# Patient Record
Sex: FEMALE | Race: WHITE | NOT HISPANIC OR LATINO | Employment: UNEMPLOYED | URBAN - METROPOLITAN AREA
[De-identification: names, ages, dates, MRNs, and addresses within clinical notes are randomized per-mention and may not be internally consistent; named-entity substitution may affect disease eponyms.]

---

## 2024-11-26 ENCOUNTER — HOSPITAL ENCOUNTER (INPATIENT)
Facility: HOSPITAL | Age: 34
LOS: 7 days | Discharge: HOME | DRG: 392 | End: 2024-12-03
Attending: EMERGENCY MEDICINE
Payer: MEDICAID

## 2024-11-26 DIAGNOSIS — N93.9 VAGINAL BLEEDING: ICD-10-CM

## 2024-11-26 DIAGNOSIS — R11.2 NAUSEA AND VOMITING, UNSPECIFIED VOMITING TYPE: Primary | ICD-10-CM

## 2024-11-26 DIAGNOSIS — R10.9 ABDOMINAL PAIN, UNSPECIFIED ABDOMINAL LOCATION: ICD-10-CM

## 2024-11-26 DIAGNOSIS — K59.00 CONSTIPATION, UNSPECIFIED CONSTIPATION TYPE: ICD-10-CM

## 2024-11-26 DIAGNOSIS — D62 ACUTE BLOOD LOSS ANEMIA: ICD-10-CM

## 2024-11-26 DIAGNOSIS — R07.9 CHEST PAIN: ICD-10-CM

## 2024-11-26 DIAGNOSIS — R10.31 RIGHT LOWER QUADRANT ABDOMINAL PAIN: ICD-10-CM

## 2024-11-26 DIAGNOSIS — R10.30 INTRACTABLE LOWER ABDOMINAL PAIN: ICD-10-CM

## 2024-11-26 PROBLEM — Z72.0 TOBACCO ABUSE: Status: ACTIVE | Noted: 2024-11-26

## 2024-11-26 PROBLEM — I95.9 HYPOTENSION: Status: ACTIVE | Noted: 2024-11-26

## 2024-11-26 PROBLEM — L02.419 LEG ABSCESS: Status: ACTIVE | Noted: 2024-11-26

## 2024-11-26 LAB
ALBUMIN SERPL BCP-MCNC: 3.4 G/DL (ref 3.5–5.2)
ALLENS TEST: ABNORMAL
ALLENS TEST: NORMAL
ALP SERPL-CCNC: 60 U/L (ref 40–150)
ALT SERPL W/O P-5'-P-CCNC: 8 U/L (ref 10–44)
ANION GAP SERPL CALC-SCNC: 6 MMOL/L (ref 8–16)
ANION GAP SERPL CALC-SCNC: 9 MMOL/L (ref 8–16)
AST SERPL-CCNC: 12 U/L (ref 10–40)
B-HCG UR QL: NEGATIVE
BASOPHILS # BLD AUTO: 0.05 K/UL (ref 0–0.2)
BASOPHILS NFR BLD: 0.3 % (ref 0–1.9)
BILIRUB SERPL-MCNC: 0.6 MG/DL (ref 0.1–1)
BILIRUB UR QL STRIP: NEGATIVE
BUN SERPL-MCNC: 13 MG/DL (ref 6–20)
BUN SERPL-MCNC: 14 MG/DL (ref 6–30)
BUN SERPL-MCNC: 9 MG/DL (ref 6–20)
CALCIUM SERPL-MCNC: 7.9 MG/DL (ref 8.7–10.5)
CALCIUM SERPL-MCNC: 9.2 MG/DL (ref 8.7–10.5)
CHLORIDE SERPL-SCNC: 103 MMOL/L (ref 95–110)
CHLORIDE SERPL-SCNC: 107 MMOL/L (ref 95–110)
CHLORIDE SERPL-SCNC: 99 MMOL/L (ref 95–110)
CLARITY UR REFRACT.AUTO: ABNORMAL
CO2 SERPL-SCNC: 24 MMOL/L (ref 23–29)
CO2 SERPL-SCNC: 24 MMOL/L (ref 23–29)
COLOR UR AUTO: YELLOW
CREAT SERPL-MCNC: 0.6 MG/DL (ref 0.5–1.4)
CREAT SERPL-MCNC: 0.6 MG/DL (ref 0.5–1.4)
CREAT SERPL-MCNC: 0.7 MG/DL (ref 0.5–1.4)
CTP QC/QA: YES
DIFFERENTIAL METHOD BLD: ABNORMAL
EOSINOPHIL # BLD AUTO: 0 K/UL (ref 0–0.5)
EOSINOPHIL NFR BLD: 0.2 % (ref 0–8)
ERYTHROCYTE [DISTWIDTH] IN BLOOD BY AUTOMATED COUNT: 16.7 % (ref 11.5–14.5)
EST. GFR  (NO RACE VARIABLE): >60 ML/MIN/1.73 M^2
EST. GFR  (NO RACE VARIABLE): >60 ML/MIN/1.73 M^2
GLUCOSE SERPL-MCNC: 66 MG/DL (ref 70–110)
GLUCOSE SERPL-MCNC: 72 MG/DL (ref 70–110)
GLUCOSE SERPL-MCNC: 89 MG/DL (ref 70–110)
GLUCOSE UR QL STRIP: NEGATIVE
HCO3 UR-SCNC: 26.4 MMOL/L (ref 24–28)
HCT VFR BLD AUTO: 26.8 % (ref 37–48.5)
HCT VFR BLD CALC: 28 %PCV (ref 36–54)
HCV AB SERPL QL IA: REACTIVE
HCV RNA SERPL QL NAA+PROBE: NOT DETECTED
HCV RNA SPEC NAA+PROBE-ACNC: NOT DETECTED IU/ML
HGB BLD-MCNC: 7.9 G/DL (ref 12–16)
HGB UR QL STRIP: ABNORMAL
HIV 1+2 AB+HIV1 P24 AG SERPL QL IA: NORMAL
IMM GRANULOCYTES # BLD AUTO: 0.06 K/UL (ref 0–0.04)
IMM GRANULOCYTES NFR BLD AUTO: 0.4 % (ref 0–0.5)
KETONES UR QL STRIP: NEGATIVE
LACTATE SERPL-SCNC: 0.6 MMOL/L (ref 0.5–2.2)
LACTATE SERPL-SCNC: 0.6 MMOL/L (ref 0.5–2.2)
LDH SERPL L TO P-CCNC: 1.8 MMOL/L (ref 0.5–2.2)
LEUKOCYTE ESTERASE UR QL STRIP: ABNORMAL
LIPASE SERPL-CCNC: 6 U/L (ref 4–60)
LYMPHOCYTES # BLD AUTO: 1.6 K/UL (ref 1–4.8)
LYMPHOCYTES NFR BLD: 10 % (ref 18–48)
MCH RBC QN AUTO: 18.7 PG (ref 27–31)
MCHC RBC AUTO-ENTMCNC: 29.5 G/DL (ref 32–36)
MCV RBC AUTO: 63 FL (ref 82–98)
MICROSCOPIC COMMENT: ABNORMAL
MONOCYTES # BLD AUTO: 0.7 K/UL (ref 0.3–1)
MONOCYTES NFR BLD: 4.1 % (ref 4–15)
NEUTROPHILS # BLD AUTO: 13.8 K/UL (ref 1.8–7.7)
NEUTROPHILS NFR BLD: 85 % (ref 38–73)
NITRITE UR QL STRIP: NEGATIVE
NRBC BLD-RTO: 0 /100 WBC
OHS QRS DURATION: 92 MS
OHS QTC CALCULATION: 441 MS
PCO2 BLDA: 45 MMHG (ref 35–45)
PH SMN: 7.38 [PH] (ref 7.35–7.45)
PH UR STRIP: 8 [PH] (ref 5–8)
PLATELET # BLD AUTO: 293 K/UL (ref 150–450)
PMV BLD AUTO: ABNORMAL FL (ref 9.2–12.9)
PO2 BLDA: 92 MMHG (ref 40–60)
POC BE: 1 MMOL/L
POC IONIZED CALCIUM: 1.18 MMOL/L (ref 1.06–1.42)
POC SATURATED O2: 97 % (ref 95–100)
POC TCO2 (MEASURED): 26 MMOL/L (ref 23–29)
POC TCO2: 28 MMOL/L (ref 24–29)
POTASSIUM BLD-SCNC: 3.7 MMOL/L (ref 3.5–5.1)
POTASSIUM SERPL-SCNC: 3.5 MMOL/L (ref 3.5–5.1)
POTASSIUM SERPL-SCNC: 3.8 MMOL/L (ref 3.5–5.1)
PROT SERPL-MCNC: 7.5 G/DL (ref 6–8.4)
PROT UR QL STRIP: ABNORMAL
RBC # BLD AUTO: 4.23 M/UL (ref 4–5.4)
RBC #/AREA URNS AUTO: 26 /HPF (ref 0–4)
SAMPLE: ABNORMAL
SAMPLE: ABNORMAL
SAMPLE: NORMAL
SITE: ABNORMAL
SITE: NORMAL
SODIUM BLD-SCNC: 136 MMOL/L (ref 136–145)
SODIUM SERPL-SCNC: 136 MMOL/L (ref 136–145)
SODIUM SERPL-SCNC: 137 MMOL/L (ref 136–145)
SP GR UR STRIP: 1.02 (ref 1–1.03)
SQUAMOUS #/AREA URNS AUTO: 5 /HPF
URN SPEC COLLECT METH UR: ABNORMAL
WBC # BLD AUTO: 16.28 K/UL (ref 3.9–12.7)
WBC #/AREA URNS AUTO: 2 /HPF (ref 0–5)

## 2024-11-26 PROCEDURE — 25000003 PHARM REV CODE 250: Performed by: EMERGENCY MEDICINE

## 2024-11-26 PROCEDURE — 63600175 PHARM REV CODE 636 W HCPCS

## 2024-11-26 PROCEDURE — 63600175 PHARM REV CODE 636 W HCPCS: Performed by: PHYSICIAN ASSISTANT

## 2024-11-26 PROCEDURE — 96367 TX/PROPH/DG ADDL SEQ IV INF: CPT

## 2024-11-26 PROCEDURE — G0378 HOSPITAL OBSERVATION PER HR: HCPCS

## 2024-11-26 PROCEDURE — A4216 STERILE WATER/SALINE, 10 ML: HCPCS | Performed by: PHYSICIAN ASSISTANT

## 2024-11-26 PROCEDURE — 87040 BLOOD CULTURE FOR BACTERIA: CPT | Mod: 59 | Performed by: EMERGENCY MEDICINE

## 2024-11-26 PROCEDURE — 25000003 PHARM REV CODE 250: Performed by: HOSPITALIST

## 2024-11-26 PROCEDURE — 83605 ASSAY OF LACTIC ACID: CPT

## 2024-11-26 PROCEDURE — 25500020 PHARM REV CODE 255: Performed by: EMERGENCY MEDICINE

## 2024-11-26 PROCEDURE — 25000003 PHARM REV CODE 250: Performed by: PHYSICIAN ASSISTANT

## 2024-11-26 PROCEDURE — 96375 TX/PRO/DX INJ NEW DRUG ADDON: CPT

## 2024-11-26 PROCEDURE — 85025 COMPLETE CBC W/AUTO DIFF WBC: CPT

## 2024-11-26 PROCEDURE — 20600001 HC STEP DOWN PRIVATE ROOM

## 2024-11-26 PROCEDURE — 96366 THER/PROPH/DIAG IV INF ADDON: CPT

## 2024-11-26 PROCEDURE — 83690 ASSAY OF LIPASE: CPT

## 2024-11-26 PROCEDURE — 81025 URINE PREGNANCY TEST: CPT

## 2024-11-26 PROCEDURE — 87070 CULTURE OTHR SPECIMN AEROBIC: CPT | Performed by: EMERGENCY MEDICINE

## 2024-11-26 PROCEDURE — 87077 CULTURE AEROBIC IDENTIFY: CPT | Performed by: EMERGENCY MEDICINE

## 2024-11-26 PROCEDURE — 83605 ASSAY OF LACTIC ACID: CPT | Performed by: PHYSICIAN ASSISTANT

## 2024-11-26 PROCEDURE — 63600175 PHARM REV CODE 636 W HCPCS: Performed by: EMERGENCY MEDICINE

## 2024-11-26 PROCEDURE — 82803 BLOOD GASES ANY COMBINATION: CPT

## 2024-11-26 PROCEDURE — 80048 BASIC METABOLIC PNL TOTAL CA: CPT | Mod: XB | Performed by: HOSPITALIST

## 2024-11-26 PROCEDURE — 99900035 HC TECH TIME PER 15 MIN (STAT)

## 2024-11-26 PROCEDURE — 99285 EMERGENCY DEPT VISIT HI MDM: CPT | Mod: 25

## 2024-11-26 PROCEDURE — 81001 URINALYSIS AUTO W/SCOPE: CPT

## 2024-11-26 PROCEDURE — 96361 HYDRATE IV INFUSION ADD-ON: CPT

## 2024-11-26 PROCEDURE — 25000003 PHARM REV CODE 250

## 2024-11-26 PROCEDURE — 87389 HIV-1 AG W/HIV-1&-2 AB AG IA: CPT | Performed by: PHYSICIAN ASSISTANT

## 2024-11-26 PROCEDURE — 87522 HEPATITIS C REVRS TRNSCRPJ: CPT | Performed by: PHYSICIAN ASSISTANT

## 2024-11-26 PROCEDURE — 86803 HEPATITIS C AB TEST: CPT | Performed by: PHYSICIAN ASSISTANT

## 2024-11-26 PROCEDURE — 83605 ASSAY OF LACTIC ACID: CPT | Mod: 91 | Performed by: HOSPITALIST

## 2024-11-26 PROCEDURE — 80053 COMPREHEN METABOLIC PANEL: CPT

## 2024-11-26 PROCEDURE — 96376 TX/PRO/DX INJ SAME DRUG ADON: CPT

## 2024-11-26 PROCEDURE — 96365 THER/PROPH/DIAG IV INF INIT: CPT

## 2024-11-26 RX ORDER — KETOROLAC TROMETHAMINE 30 MG/ML
15 INJECTION, SOLUTION INTRAMUSCULAR; INTRAVENOUS EVERY 6 HOURS PRN
Status: DISPENSED | OUTPATIENT
Start: 2024-11-26 | End: 2024-11-29

## 2024-11-26 RX ORDER — TALC
6 POWDER (GRAM) TOPICAL NIGHTLY PRN
Status: DISCONTINUED | OUTPATIENT
Start: 2024-11-26 | End: 2024-12-03 | Stop reason: HOSPADM

## 2024-11-26 RX ORDER — METHADONE HYDROCHLORIDE 10 MG/1
60 TABLET ORAL
Status: DISCONTINUED | OUTPATIENT
Start: 2024-11-26 | End: 2024-11-26

## 2024-11-26 RX ORDER — MORPHINE SULFATE 2 MG/ML
2 INJECTION, SOLUTION INTRAMUSCULAR; INTRAVENOUS EVERY 4 HOURS PRN
Status: DISCONTINUED | OUTPATIENT
Start: 2024-11-26 | End: 2024-11-26

## 2024-11-26 RX ORDER — ONDANSETRON HYDROCHLORIDE 2 MG/ML
4 INJECTION, SOLUTION INTRAVENOUS
Status: COMPLETED | OUTPATIENT
Start: 2024-11-26 | End: 2024-11-26

## 2024-11-26 RX ORDER — ACETAMINOPHEN 500 MG
1000 TABLET ORAL EVERY 8 HOURS
Status: DISCONTINUED | OUTPATIENT
Start: 2024-11-26 | End: 2024-12-03 | Stop reason: HOSPADM

## 2024-11-26 RX ORDER — SODIUM CHLORIDE 0.9 % (FLUSH) 0.9 %
10 SYRINGE (ML) INJECTION
Status: DISCONTINUED | OUTPATIENT
Start: 2024-11-26 | End: 2024-12-03 | Stop reason: HOSPADM

## 2024-11-26 RX ORDER — POLYETHYLENE GLYCOL 3350 17 G/17G
17 POWDER, FOR SOLUTION ORAL DAILY PRN
Status: DISCONTINUED | OUTPATIENT
Start: 2024-11-26 | End: 2024-11-28

## 2024-11-26 RX ORDER — LIDOCAINE HYDROCHLORIDE 10 MG/ML
10 INJECTION, SOLUTION EPIDURAL; INFILTRATION; INTRACAUDAL; PERINEURAL
Status: COMPLETED | OUTPATIENT
Start: 2024-11-26 | End: 2024-11-26

## 2024-11-26 RX ORDER — SODIUM CHLORIDE 9 MG/ML
INJECTION, SOLUTION INTRAVENOUS CONTINUOUS
Status: ACTIVE | OUTPATIENT
Start: 2024-11-26 | End: 2024-11-27

## 2024-11-26 RX ORDER — KETOROLAC TROMETHAMINE 30 MG/ML
15 INJECTION, SOLUTION INTRAMUSCULAR; INTRAVENOUS EVERY 6 HOURS PRN
Status: DISCONTINUED | OUTPATIENT
Start: 2024-11-26 | End: 2024-11-26

## 2024-11-26 RX ORDER — OXYCODONE HYDROCHLORIDE 5 MG/1
5 TABLET ORAL EVERY 6 HOURS PRN
Status: DISCONTINUED | OUTPATIENT
Start: 2024-11-26 | End: 2024-11-27

## 2024-11-26 RX ORDER — KETOROLAC TROMETHAMINE 30 MG/ML
INJECTION, SOLUTION INTRAMUSCULAR; INTRAVENOUS
Status: COMPLETED
Start: 2024-11-26 | End: 2024-11-26

## 2024-11-26 RX ORDER — GLUCAGON 1 MG
1 KIT INJECTION
Status: DISCONTINUED | OUTPATIENT
Start: 2024-11-26 | End: 2024-12-03 | Stop reason: HOSPADM

## 2024-11-26 RX ORDER — IBUPROFEN 200 MG
24 TABLET ORAL
Status: DISCONTINUED | OUTPATIENT
Start: 2024-11-26 | End: 2024-12-03 | Stop reason: HOSPADM

## 2024-11-26 RX ORDER — ONDANSETRON 8 MG/1
8 TABLET, ORALLY DISINTEGRATING ORAL EVERY 8 HOURS PRN
Status: DISCONTINUED | OUTPATIENT
Start: 2024-11-26 | End: 2024-12-03 | Stop reason: HOSPADM

## 2024-11-26 RX ORDER — NALOXONE HCL 0.4 MG/ML
0.02 VIAL (ML) INJECTION
Status: DISCONTINUED | OUTPATIENT
Start: 2024-11-26 | End: 2024-12-03 | Stop reason: HOSPADM

## 2024-11-26 RX ORDER — ACETAMINOPHEN 325 MG/1
650 TABLET ORAL EVERY 4 HOURS PRN
Status: DISCONTINUED | OUTPATIENT
Start: 2024-11-26 | End: 2024-11-26

## 2024-11-26 RX ORDER — PROCHLORPERAZINE EDISYLATE 5 MG/ML
5 INJECTION INTRAMUSCULAR; INTRAVENOUS EVERY 6 HOURS PRN
Status: DISCONTINUED | OUTPATIENT
Start: 2024-11-26 | End: 2024-12-03 | Stop reason: HOSPADM

## 2024-11-26 RX ORDER — METHADONE HYDROCHLORIDE 10 MG/1
110 TABLET ORAL DAILY
Status: DISCONTINUED | OUTPATIENT
Start: 2024-11-26 | End: 2024-12-03 | Stop reason: HOSPADM

## 2024-11-26 RX ORDER — IBUPROFEN 200 MG
16 TABLET ORAL
Status: DISCONTINUED | OUTPATIENT
Start: 2024-11-26 | End: 2024-12-03 | Stop reason: HOSPADM

## 2024-11-26 RX ORDER — MORPHINE SULFATE 4 MG/ML
4 INJECTION, SOLUTION INTRAMUSCULAR; INTRAVENOUS
Status: COMPLETED | OUTPATIENT
Start: 2024-11-26 | End: 2024-11-26

## 2024-11-26 RX ORDER — SIMETHICONE 80 MG
1 TABLET,CHEWABLE ORAL 4 TIMES DAILY PRN
Status: DISCONTINUED | OUTPATIENT
Start: 2024-11-26 | End: 2024-12-03 | Stop reason: HOSPADM

## 2024-11-26 RX ORDER — SODIUM CHLORIDE 0.9 % (FLUSH) 0.9 %
10 SYRINGE (ML) INJECTION EVERY 8 HOURS
Status: DISCONTINUED | OUTPATIENT
Start: 2024-11-26 | End: 2024-12-03 | Stop reason: HOSPADM

## 2024-11-26 RX ORDER — ALUMINUM HYDROXIDE, MAGNESIUM HYDROXIDE, AND SIMETHICONE 1200; 120; 1200 MG/30ML; MG/30ML; MG/30ML
30 SUSPENSION ORAL 4 TIMES DAILY PRN
Status: DISCONTINUED | OUTPATIENT
Start: 2024-11-26 | End: 2024-12-03 | Stop reason: HOSPADM

## 2024-11-26 RX ORDER — IPRATROPIUM BROMIDE AND ALBUTEROL SULFATE 2.5; .5 MG/3ML; MG/3ML
3 SOLUTION RESPIRATORY (INHALATION) EVERY 6 HOURS PRN
Status: DISCONTINUED | OUTPATIENT
Start: 2024-11-26 | End: 2024-12-03 | Stop reason: HOSPADM

## 2024-11-26 RX ADMIN — PIPERACILLIN SODIUM AND TAZOBACTAM SODIUM 4.5 G: 4; .5 INJECTION, POWDER, FOR SOLUTION INTRAVENOUS at 05:11

## 2024-11-26 RX ADMIN — LIDOCAINE HYDROCHLORIDE 100 MG: 10 INJECTION, SOLUTION EPIDURAL; INFILTRATION; INTRACAUDAL at 09:11

## 2024-11-26 RX ADMIN — SODIUM CHLORIDE 500 ML: 9 INJECTION, SOLUTION INTRAVENOUS at 04:11

## 2024-11-26 RX ADMIN — MORPHINE SULFATE 4 MG: 4 INJECTION INTRAVENOUS at 02:11

## 2024-11-26 RX ADMIN — PIPERACILLIN SODIUM AND TAZOBACTAM SODIUM 4.5 G: 4; .5 INJECTION, POWDER, FOR SOLUTION INTRAVENOUS at 03:11

## 2024-11-26 RX ADMIN — MORPHINE SULFATE 2 MG: 2 INJECTION, SOLUTION INTRAMUSCULAR; INTRAVENOUS at 11:11

## 2024-11-26 RX ADMIN — Medication 10 ML: at 10:11

## 2024-11-26 RX ADMIN — SODIUM CHLORIDE 1000 ML: 9 INJECTION, SOLUTION INTRAVENOUS at 09:11

## 2024-11-26 RX ADMIN — ACETAMINOPHEN 1000 MG: 500 TABLET ORAL at 03:11

## 2024-11-26 RX ADMIN — IOHEXOL 75 ML: 350 INJECTION, SOLUTION INTRAVENOUS at 04:11

## 2024-11-26 RX ADMIN — SODIUM CHLORIDE, POTASSIUM CHLORIDE, SODIUM LACTATE AND CALCIUM CHLORIDE 500 ML: 600; 310; 30; 20 INJECTION, SOLUTION INTRAVENOUS at 03:11

## 2024-11-26 RX ADMIN — SODIUM CHLORIDE: 9 INJECTION, SOLUTION INTRAVENOUS at 08:11

## 2024-11-26 RX ADMIN — KETOROLAC TROMETHAMINE 15 MG: 30 INJECTION, SOLUTION INTRAMUSCULAR; INTRAVENOUS at 09:11

## 2024-11-26 RX ADMIN — KETOROLAC TROMETHAMINE 15 MG: 30 INJECTION, SOLUTION INTRAMUSCULAR; INTRAVENOUS at 10:11

## 2024-11-26 RX ADMIN — VANCOMYCIN HYDROCHLORIDE 1250 MG: 1.25 INJECTION, POWDER, LYOPHILIZED, FOR SOLUTION INTRAVENOUS at 04:11

## 2024-11-26 RX ADMIN — METHADONE HYDROCHLORIDE 110 MG: 10 TABLET ORAL at 03:11

## 2024-11-26 RX ADMIN — SODIUM CHLORIDE 200 ML: 9 INJECTION, SOLUTION INTRAVENOUS at 10:11

## 2024-11-26 RX ADMIN — Medication 10 ML: at 03:11

## 2024-11-26 RX ADMIN — ACETAMINOPHEN 1000 MG: 500 TABLET ORAL at 10:11

## 2024-11-26 RX ADMIN — ONDANSETRON 4 MG: 2 INJECTION INTRAMUSCULAR; INTRAVENOUS at 02:11

## 2024-11-26 NOTE — CARE UPDATE
[facility] Sepsis Follow Up Note     Patient's sepsis care was reviewed and a perfusion exam ws performed within 6 hours of fluid resuscitation that showed adequate tissue perfusion assessed by non invasive monitoring on 11/26/2024 at 1:50 PM.    Patient has received appropriate sepsis care and a fluid challenge of Actual Body weight- Patient will receive 30ml/kg actual body weight to calculate fluid bolus for treatment of septic shock..    Patient remained drowsy this morning. BP drops without fluids infusing.On maintenance fluids now. Energy and mentation improving.  Will consult MICU for MAP < 65.     Hermilo Guerin, PAJoshuaC  [facility]

## 2024-11-26 NOTE — PHARMACY MED REC
"      Admission Medication History     The home medication history was taken by Ro Ferrera.    You may go to "Admission" then "Reconcile Home Medications" tabs to review and/or act upon these items.     The home medication list has been updated by the Pharmacy department.   Please read ALL comments highlighted in yellow.   Please address this information as you see fit.    Feel free to contact us if you have any questions or require assistance.      The medications listed below were removed from the home medication list. Please reorder if appropriate:  Patient reports no longer taking the following medication(s):  FLUTICASONE PROPIONATE 50 MCG NASAL SPRAY      Medications listed below were obtained from: Patient/family  Current Outpatient Medications on File Prior to Encounter   Medication Sig    methadone (DOLOPHINE) 10 MG tablet Take 110 mg by mouth once daily.       Ro Ferrera              .          "

## 2024-11-26 NOTE — ASSESSMENT & PLAN NOTE
Nausea and vomiting    - afebrile VSS on RA  - WBC 16.28, trending  No significant electrolyte abnormalities  - UA noninfectious but w/ 26 RBC  - CT a/p: Mild asymmetric enlargement of the right ovary of uncertain clinical significance in this patient with reported right lower quadrant pain.  - pelvic US pending  - MM pain regimen: tylenol 1 g q8h, toradol 15 q6h prn, IV morphine  - prn antiemetics  - continue zosyn and vancomycin   - CLD ADAT  - gyn consult pending pelvic US  - continue to monitor

## 2024-11-26 NOTE — HPI
Ms. Sweet has a history of IVDU in remission and long term use of methadone at a clinic, recurrent leg abscesses, last seen in June 2024 for an abscess, incision and drainage performed, cultures grew strep intermedius, prescribed bactrim, and now presents for abdominal pain and tenderness along with vomiting. The patient reports abdominal pain started yesterday evening approx 5 hours prior to arrival. She had sudden sharp, stabbing pain in her right lower quadrant. Shortly after the pain began, she experienced intractable vomiting. Her spouse called EMS. She endorses headache and dizziness but no chest pain or palpitations. Two days prior, the patient reported having a fever. She remembered that one of her temperatures were greater than 100. Additionally she reports an abscess on the right lower leg that is purulent and painful. Her spouse says she has recurrent abscesses often and does not complete the antibiotic regimen prescribed. She is currently on her menses. She denied dysuria and hematuria. Her spouse says she was concerned for a UTI due to dysuria two weeks prior to tonight's presentation. Last bowel movement was 11/24.     In ED, Pt afebrile w/ soft BP 94/51, other VSS on RA. WBC 16.28. Hgb 7.9. No significant electrolyte abnormalities. UA noninfectious but w/ 26 RBC. CT a/p: Mild asymmetric enlargement of the right ovary of uncertain clinical significance in this patient with reported right lower quadrant pain. Given IV morphine, IV antiemetics, zosyn and vancomycin and IVF.

## 2024-11-26 NOTE — ED PROVIDER NOTES
Encounter Date: 2024       History     Chief Complaint   Patient presents with    Abdominal Pain     Arrives via EMS for RLQ abdominal pain & N/V x4 hrs.     Ms. Sweet has a history of IVDU in remission and long term use of methadone at a clinic, recurrent leg abscesses, last seen in 2024 for an abscess, incision and drainage performed, cultures grew strep intermedius, prescribed bactrim, [facility] for abdominal pain and tenderness along with vomiting.     The patient had abdominal pain that started earlier this evening approx 5 hours prior to arrival. She had sudden sharp, stabbing pain in her right lower quadrant. Shortly after the pain began, she experienced intractable vomiting, with the last episode prior to EMS arrival. Her spouse called EMS. She endorses headache and dizziness but no chest pain or palpitations. Two days prior, the patient reported having a fever. She remembered that one of her temperatures were greater than 100.     Ms. Bundy also has an abscess on the right lower leg that is purulent and painful. Her spouse says she has recurrent abscesses often and does not complete the antibiotic regimen prescribed.     Ms. Bundy is currently on her menses. She denied dysuria and hematuria. Her spouse says she was concerned for a UTI due to dysuria two weeks prior to tonight's presentation. Last bowel movement was .     The history is provided by the patient and the spouse. The history is limited by the condition of the patient. No  was used.     Review of patient's allergies indicates:  No Known Allergies  Past Medical History:   Diagnosis Date    Hx of intravenous drug use in remission     Long-term current use of methadone for opiate dependence      Past Surgical History:   Procedure Laterality Date     SECTION      x2    TUBAL LIGATION       Family History   Problem Relation Name Age of Onset    Breast cancer  Neg Hx      Cancer Neg Hx       Social History     Tobacco Use    Smoking status: Every Day     Current packs/day: 0.50     Average packs/day: 0.5 packs/day for 4.0 years (2.0 ttl pk-yrs)     Types: Cigarettes    Smokeless tobacco: Never   Substance Use Topics    Alcohol use: No    Drug use: No     Types: IV, Heroin, Cocaine, Amphetamines     Review of Systems    Physical Exam     Initial Vitals [11/26/24 0137]   BP Pulse Resp Temp SpO2   101/63 82 18 99 °F (37.2 °C) 99 %      MAP       --         Physical Exam    Vitals reviewed.  Constitutional: She appears distressed.   HENT: Mouth/Throat: Mucous membranes are dry.   Cardiovascular:  Normal rate and regular rhythm.           Pulmonary/Chest: Effort normal and breath sounds normal. No respiratory distress.   Abdominal: Bowel sounds are normal. There is abdominal tenderness in the right lower quadrant and periumbilical area. positive Rovsing's sign  Musculoskeletal:         General: Tenderness present.      Comments: Abscess, right lower leg  Scabs and scarring on bilateral lower extremities     Neurological:   Progressively somnolent post physical exam          ED Course   Procedures  Labs Reviewed   CULTURE, BLOOD   CULTURE, BLOOD   CULTURE, AEROBIC  (SPECIFY SOURCE)   HEPATITIS C ANTIBODY   HIV 1 / 2 ANTIBODY   CBC W/ AUTO DIFFERENTIAL   COMPREHENSIVE METABOLIC PANEL   LIPASE   URINALYSIS, REFLEX TO URINE CULTURE   POCT URINE PREGNANCY   ISTAT CHEM8          Imaging Results    None          Medications   sodium chloride 0.9% bolus 500 mL 500 mL (has no administration in time range)   vancomycin 1,250 mg in 0.9% NaCl 250 mL IVPB (admixture device) (has no administration in time range)   piperacillin-tazobactam (ZOSYN) 4.5 g in D5W 100 mL IVPB (MB+) (has no administration in time range)   morphine injection 4 mg (4 mg Intravenous Given 11/26/24 0254)   ondansetron injection 4 mg (4 mg Intravenous Given 11/26/24 0252)     Medical Decision Making  DDX: abdominal  abscess, appendicitis, ovarian abscess, ovarian torsion, intestinal perforation    CTAP with IV contrast for above differential diagnoses, 500 NS bolus, pain management with morphine, nausea management with zofran. Blood cultures and lactate for potential blood infection given history of recurrent abscesses.    Signed out to Dr. Mock.     Amount and/or Complexity of Data Reviewed  Labs: ordered.  Radiology: ordered.    Risk  Prescription drug management.                                      Clinical Impression:  Final diagnoses:  [R11.2] Nausea and vomiting, unspecified vomiting type (Primary)  [R10.31] Right lower quadrant abdominal pain                 Demetrio Martinez MD  Resident  11/26/24 1421

## 2024-11-26 NOTE — ED NOTES
Bed: EDOU03  Expected date:   Expected time:   Means of arrival:   Comments:  No bed. Ordered from Transport

## 2024-11-26 NOTE — ED NOTES
Assumed care of patient at this time. Pt placed in hospital gown and currently lying in stretcher. Vital signs are stable, provided pt with warm blanket. Pt denied restroom use.     LOC: The patient is awake, alert, aware of environment with an appropriate affect. Oriented x3, speaking appropriately  APPEARANCE: Pt resting comfortably, in no acute distress, pt is clean and well groomed, clothing properly fastened  SKIN: Skin warm, dry ,open wound noted to right low leg. normal skin turgor, moist mucus membranes  RESPIRATORY: Airway is open and patent, respirations are spontaneous, even and unlabored, normal effort and rate  CARDIAC: Normal rate and rhythm, no peripheral edema noted, capillary refill < 3 seconds, bilateral radial pulses 2+  ABDOMEN: Reported right low quads abdominal pain.  NEUROLOGIC: PERRL, facial expression is symmetrical, patient moving all extremities spontaneously, normal sensation in all extremities when touched with a finger.  Follows all commands appropriately  MUSCULOSKELETAL: No obvious deformities.

## 2024-11-26 NOTE — PROVIDER PROGRESS NOTES - EMERGENCY DEPT.
Encounter Date: 11/26/2024    ED Physician Progress Notes          Patient signed out by  awaiting read of CT abdomen.  CT abdomen independently interpreted by myself shows no significant acute process except for right adnexal fullness.  Pelvic ultrasound ordered.  Patient was admitted to the hospital for further evaluation of abdominal pain and early septic like picture.  Discussed case with Internal Medicine and they will admit

## 2024-11-26 NOTE — H&P
[facility] - Emergency Dept  [facility] Medicine  History & Physical    Patient Name: Kavya Bundy  MRN: [id number]  Patient Class: OP- Observation  Admission Date: 11/26/2024  Attending Physician: Chriss Vazquez DO   Primary Care Provider: Otilia, Primary Doctor         Patient information was obtained from patient, spouse/SO, past medical records, and ER records.     Subjective:     Principal Problem:Intractable lower abdominal pain    Chief Complaint:   Chief Complaint   Patient presents with    Abdominal Pain     Arrives via EMS for RLQ abdominal pain & N/V x4 hrs.        HPI: Ms. Sweet has a history of IVDU in remission and long term use of methadone at a clinic, recurrent leg abscesses, last seen in June 2024 for an abscess, incision and drainage performed, cultures grew strep intermedius, prescribed bactrim, and now presents for abdominal pain and tenderness along with vomiting. The patient reports abdominal pain started yesterday evening approx 5 hours prior to arrival. She had sudden sharp, stabbing pain in her right lower quadrant. Shortly after the pain began, she experienced intractable vomiting. Her spouse called EMS. She endorses headache and dizziness but no chest pain or palpitations. Two days prior, the patient reported having a fever. She remembered that one of her temperatures were greater than 100. Additionally she reports an abscess on the right lower leg that is purulent and painful. Her spouse says she has recurrent abscesses often and does not complete the antibiotic regimen prescribed. She is currently on her menses. She denied dysuria and hematuria. Her spouse says she was concerned for a UTI due to dysuria two weeks prior to tonight's presentation. Last bowel movement was 11/24.     In ED, Pt afebrile w/ soft BP 94/51, other VSS on RA. WBC 16.28. Hgb 7.9. No significant electrolyte abnormalities. UA noninfectious but w/ 26 RBC. CT a/p: Mild asymmetric enlargement of the right ovary of  uncertain clinical significance in this patient with reported right lower quadrant pain. Given IV morphine, IV antiemetics, zosyn and vancomycin and IVF.     Past Medical History:   Diagnosis Date    Hx of intravenous drug use in remission     Long-term current use of methadone for opiate dependence        Past Surgical History:   Procedure Laterality Date     SECTION      x2    TUBAL LIGATION         Review of patient's allergies indicates:  No Known Allergies    No current facility-administered medications on file prior to encounter.     Current Outpatient Medications on File Prior to Encounter   Medication Sig    fluticasone propionate (FLONASE) 50 mcg/actuation nasal spray 2 sprays (100 mcg total) by Each Nostril route once daily.    methadone (DOLOPHINE) 10 MG tablet Take 20 mg by mouth once daily.     Family History    None       Tobacco Use    Smoking status: Every Day     Current packs/day: 0.50     Average packs/day: 0.5 packs/day for 4.0 years (2.0 ttl pk-yrs)     Types: Cigarettes    Smokeless tobacco: Never   Substance and Sexual Activity    Alcohol use: No    Drug use: No     Types: IV, Heroin, Cocaine, Amphetamines    Sexual activity: Yes     Partners: Male     Review of Systems   Constitutional:  Positive for fever. Negative for activity change, chills, diaphoresis and fatigue.   HENT:  Negative for congestion.    Respiratory:  Negative for cough, chest tightness, shortness of breath and wheezing.    Cardiovascular:  Negative for chest pain, palpitations and leg swelling.   Gastrointestinal:  Positive for abdominal pain, nausea and vomiting. Negative for abdominal distention, blood in stool, constipation and diarrhea.   Genitourinary:  Positive for dysuria. Negative for difficulty urinating, frequency, hematuria and urgency.   Musculoskeletal:  Negative for arthralgias, back pain and neck stiffness.   Neurological:  Negative for dizziness, tremors, seizures, syncope, weakness,  light-headedness, numbness and headaches.   Psychiatric/Behavioral:  Negative for agitation, confusion and hallucinations.      Objective:     Vital Signs (Most Recent):  Temp: 99.1 °F (37.3 °C) (11/26/24 0730)  Pulse: 76 (11/26/24 0730)  Resp: 20 (11/26/24 0730)  BP: (!) 94/51 (11/26/24 0730)  SpO2: 100 % (11/26/24 0730) Vital Signs (24h Range):  Temp:  [98.6 °F (37 °C)-99.1 °F (37.3 °C)] 99.1 °F (37.3 °C)  Pulse:  [69-82] 76  Resp:  [17-26] 20  SpO2:  [97 %-100 %] 100 %  BP: ()/(51-63) 94/51     Weight: 65.8 kg (145 lb)  Body mass index is 22.71 kg/m².     Physical Exam  Vitals and nursing note reviewed.   Constitutional:       General: She is not in acute distress.     Appearance: She is well-developed. She is not diaphoretic.   HENT:      Head: Normocephalic and atraumatic.      Right Ear: External ear normal.      Left Ear: External ear normal.      Nose: Nose normal. No congestion.      Mouth/Throat:      Mouth: Mucous membranes are dry.      Pharynx: Oropharynx is clear.   Eyes:      General: No scleral icterus.     Extraocular Movements: Extraocular movements intact.   Cardiovascular:      Rate and Rhythm: Normal rate and regular rhythm.      Pulses: Normal pulses.      Heart sounds: Normal heart sounds. No murmur heard.  Pulmonary:      Effort: Pulmonary effort is normal. No respiratory distress.      Breath sounds: Normal breath sounds. No wheezing or rales.   Abdominal:      General: Bowel sounds are normal. There is no distension.      Palpations: Abdomen is soft.      Tenderness: There is abdominal tenderness (lower abdomen). There is no guarding or rebound.   Musculoskeletal:      Cervical back: Normal range of motion.      Right lower leg: No edema.      Left lower leg: No edema.   Skin:     General: Skin is warm and dry.      Capillary Refill: Capillary refill takes 2 to 3 seconds.      Comments: Abscess, right lower leg. Scabs and scarring on bilateral lower extremities    Neurological:       General: No focal deficit present.      Mental Status: She is oriented to person, place, and time. Mental status is at baseline. She is lethargic.   Psychiatric:         Mood and Affect: Mood normal.         Behavior: Behavior normal.         Thought Content: Thought content normal.                Significant Labs: All pertinent labs within the past 24 hours have been reviewed.  CBC:   Recent Labs   Lab 11/26/24  0250 11/26/24  0339   WBC 16.28*  --    HGB 7.9*  --    HCT 26.8* 28*     --      CMP:   Recent Labs   Lab 11/26/24  0250      K 3.8      CO2 24   GLU 72   BUN 13   CREATININE 0.6   CALCIUM 9.2   PROT 7.5   ALBUMIN 3.4*   BILITOT 0.6   ALKPHOS 60   AST 12   ALT 8*   ANIONGAP 9     Urine Studies:   Recent Labs   Lab 11/26/24  0409   COLORU Yellow   APPEARANCEUA Hazy*   PHUR 8.0   SPECGRAV 1.025   PROTEINUA Trace*   GLUCUA Negative   KETONESU Negative   BILIRUBINUA Negative   OCCULTUA 2+*   NITRITE Negative   LEUKOCYTESUR Trace*   RBCUA 26*   WBCUA 2   SQUAMEPITHEL 5       Significant Imaging: I have reviewed all pertinent imaging results/findings within the past 24 hours.  Imaging Results              CT Abdomen Pelvis With IV Contrast NO Oral Contrast (Final result)  Result time 11/26/24 08:27:06      Final result by Stu Moss Jr., MD (11/26/24 08:27:06)                   Impression:      Mild asymmetric enlargement of the right ovary of uncertain clinical significance in this patient with reported right lower quadrant pain.  Further evaluation can be obtained with pelvic ultrasound.    Hepatomegaly.    Electronically signed by resident: Stephanie Tovar  Date:    11/26/2024  Time:    07:38    Electronically signed by: Stu Landry Jr  Date:    11/26/2024  Time:    08:27               Narrative:    EXAMINATION:  CT ABDOMEN PELVIS WITH IV CONTRAST    CLINICAL HISTORY:  Abdominal abscess/infection suspected;RLQ abdominal pain (Age >= 14y);    TECHNIQUE:  Axial images of  the abdomen and pelvis were acquired after the use of 75 cc [id number] IV contrast.  Coronal and sagittal reconstructions were also obtained.    COMPARISON:  CT lumbar spine 04/16/2018    FINDINGS:  Heart: Normal in size. No pericardial effusion.    Lungs: Mild dependent atelectasis.  No focal consolidation or pleural effusion.    Liver: Enlarged, measuring 20.2 cm.  No focal hepatic lesion.    Gallbladder: No calcified gallstones.    Bile Ducts: No evidence of dilated ducts.    Pancreas: No mass or peripancreatic fat stranding.    Spleen: Unremarkable.    Stomach and duodenum: Unremarkable.    Adrenals: Unremarkable.    Kidneys/Ureters: Normal in size and location. Normal enhancement. No hydronephrosis or nephrolithiasis. Ureters are not well visualized in their entirety.  No overt ureteral dilatation.    Bladder: No evidence of wall thickening.    Reproductive organs: Mild asymmetric enlargement of the right ovary.  Follicle is noted on the left.    Bowel/Mesentery: Small bowel is normal in caliber with no evidence of obstruction. No evidence of inflammation or wall thickening.  Colon demonstrates no focal wall thickening. The appendix is normal.    Peritoneum: Trace pelvic free fluid, likely physiologic.  No intraperitoneal free air.    Lymph nodes: No retroperitoneal lymphadenopathy.    Vasculature: No aneurysm. No significant calcific atherosclerosis.    Abdominal wall: Unremarkable.    Bones: No acute fracture. No suspicious osseous lesions.  Few sclerotic foci in pelvis, likely bone islands.                                     Assessment/Plan:     * Intractable lower abdominal pain  Nausea and vomiting    - afebrile VSS on RA  - WBC 16.28, trending  No significant electrolyte abnormalities  - UA noninfectious but w/ 26 RBC  - CT a/p: Mild asymmetric enlargement of the right ovary of uncertain clinical significance in this patient with reported right lower quadrant pain.  - pelvic US pending  - MM pain regimen:  tylenol 1 g q8h, toradol 15 q6h prn, IV morphine  - prn antiemetics  - continue zosyn and vancomycin   - CLD ADAT  - gyn consult pending pelvic US  - continue to monitor    Tobacco abuse  Dangers of cigarette smoking were reviewed with patient in detail for 10 minutes and patient was encouraged to quit. Nicotine replacement options were discussed.    Leg abscess  - WBC 16, trending  - continue vanc and zosyn  - wound care consulted    Hypotension  - BP 90/50s  - I/s/o poor oral intake  - s/p 1 L NS bolus  - encourage oral fluid intake  - continue to monitor BP    Polysubstance abuse  - denies any recent drug use.   - methadone 110 mg qd  - consider addiction psych consult      VTE Risk Mitigation (From admission, onward)           Ordered     IP VTE LOW RISK PATIENT  Once         11/26/24 0931     Place CHERYL hose  Until discontinued         11/26/24 0931     Place sequential compression device  Until discontinued         11/26/24 0931     Place sequential compression device  Until discontinued         11/26/24 0931                         On 11/26/2024, patient should be placed in hospital observation services under my care in collaboration with Dr. Vazquez.           Kera Ferrera PA-C  Department of Hospital Medicine  [facility] - Emergency Dept

## 2024-11-26 NOTE — ED TRIAGE NOTES
Kavya Bundy, a 34 y.o. female presents to the ED w/ complaint of right low quads abdominal pain,vomiting,nausea.Patient denies any SOB,chest pain,chill,fever at this time.    Triage note:  Chief Complaint   Patient presents with    Abdominal Pain     Arrives via EMS for RLQ abdominal pain & N/V x4 hrs.     Review of patient's allergies indicates:  No Known Allergies  Past Medical History:   Diagnosis Date    Hx of intravenous drug use in remission     Long-term current use of methadone for opiate dependence

## 2024-11-26 NOTE — SUBJECTIVE & OBJECTIVE
Past Medical History:   Diagnosis Date    Hx of intravenous drug use in remission     Long-term current use of methadone for opiate dependence        Past Surgical History:   Procedure Laterality Date     SECTION      x2    TUBAL LIGATION         Review of patient's allergies indicates:  No Known Allergies    No current facility-administered medications on file prior to encounter.     Current Outpatient Medications on File Prior to Encounter   Medication Sig    fluticasone propionate (FLONASE) 50 mcg/actuation nasal spray 2 sprays (100 mcg total) by Each Nostril route once daily.    methadone (DOLOPHINE) 10 MG tablet Take 20 mg by mouth once daily.     Family History    None       Tobacco Use    Smoking status: Every Day     Current packs/day: 0.50     Average packs/day: 0.5 packs/day for 4.0 years (2.0 ttl pk-yrs)     Types: Cigarettes    Smokeless tobacco: Never   Substance and Sexual Activity    Alcohol use: No    Drug use: No     Types: IV, Heroin, Cocaine, Amphetamines    Sexual activity: Yes     Partners: Male     Review of Systems   Constitutional:  Positive for fever. Negative for activity change, chills, diaphoresis and fatigue.   HENT:  Negative for congestion.    Respiratory:  Negative for cough, chest tightness, shortness of breath and wheezing.    Cardiovascular:  Negative for chest pain, palpitations and leg swelling.   Gastrointestinal:  Positive for abdominal pain, nausea and vomiting. Negative for abdominal distention, blood in stool, constipation and diarrhea.   Genitourinary:  Positive for dysuria. Negative for difficulty urinating, frequency, hematuria and urgency.   Musculoskeletal:  Negative for arthralgias, back pain and neck stiffness.   Neurological:  Negative for dizziness, tremors, seizures, syncope, weakness, light-headedness, numbness and headaches.   Psychiatric/Behavioral:  Negative for agitation, confusion and hallucinations.      Objective:     Vital Signs (Most  Recent):  Temp: 99.1 °F (37.3 °C) (11/26/24 0730)  Pulse: 76 (11/26/24 0730)  Resp: 20 (11/26/24 0730)  BP: (!) 94/51 (11/26/24 0730)  SpO2: 100 % (11/26/24 0730) Vital Signs (24h Range):  Temp:  [98.6 °F (37 °C)-99.1 °F (37.3 °C)] 99.1 °F (37.3 °C)  Pulse:  [69-82] 76  Resp:  [17-26] 20  SpO2:  [97 %-100 %] 100 %  BP: ()/(51-63) 94/51     Weight: 65.8 kg (145 lb)  Body mass index is 22.71 kg/m².     Physical Exam  Vitals and nursing note reviewed.   Constitutional:       General: She is not in acute distress.     Appearance: She is well-developed. She is not diaphoretic.   HENT:      Head: Normocephalic and atraumatic.      Right Ear: External ear normal.      Left Ear: External ear normal.      Nose: Nose normal. No congestion.      Mouth/Throat:      Mouth: Mucous membranes are dry.      Pharynx: Oropharynx is clear.   Eyes:      General: No scleral icterus.     Extraocular Movements: Extraocular movements intact.   Cardiovascular:      Rate and Rhythm: Normal rate and regular rhythm.      Pulses: Normal pulses.      Heart sounds: Normal heart sounds. No murmur heard.  Pulmonary:      Effort: Pulmonary effort is normal. No respiratory distress.      Breath sounds: Normal breath sounds. No wheezing or rales.   Abdominal:      General: Bowel sounds are normal. There is no distension.      Palpations: Abdomen is soft.      Tenderness: There is abdominal tenderness (lower abdomen). There is no guarding or rebound.   Musculoskeletal:      Cervical back: Normal range of motion.      Right lower leg: No edema.      Left lower leg: No edema.   Skin:     General: Skin is warm and dry.      Capillary Refill: Capillary refill takes 2 to 3 seconds.      Comments: Abscess, right lower leg. Scabs and scarring on bilateral lower extremities    Neurological:      General: No focal deficit present.      Mental Status: She is oriented to person, place, and time. Mental status is at baseline. She is lethargic.   Psychiatric:          Mood and Affect: Mood normal.         Behavior: Behavior normal.         Thought Content: Thought content normal.                Significant Labs: All pertinent labs within the past 24 hours have been reviewed.  CBC:   Recent Labs   Lab 11/26/24  0250 11/26/24  0339   WBC 16.28*  --    HGB 7.9*  --    HCT 26.8* 28*     --      CMP:   Recent Labs   Lab 11/26/24  0250      K 3.8      CO2 24   GLU 72   BUN 13   CREATININE 0.6   CALCIUM 9.2   PROT 7.5   ALBUMIN 3.4*   BILITOT 0.6   ALKPHOS 60   AST 12   ALT 8*   ANIONGAP 9     Urine Studies:   Recent Labs   Lab 11/26/24  0409   COLORU Yellow   APPEARANCEUA Hazy*   PHUR 8.0   SPECGRAV 1.025   PROTEINUA Trace*   GLUCUA Negative   KETONESU Negative   BILIRUBINUA Negative   OCCULTUA 2+*   NITRITE Negative   LEUKOCYTESUR Trace*   RBCUA 26*   WBCUA 2   SQUAMEPITHEL 5       Significant Imaging: I have reviewed all pertinent imaging results/findings within the past 24 hours.  Imaging Results              CT Abdomen Pelvis With IV Contrast NO Oral Contrast (Final result)  Result time 11/26/24 08:27:06      Final result by Stu Moss Jr., MD (11/26/24 08:27:06)                   Impression:      Mild asymmetric enlargement of the right ovary of uncertain clinical significance in this patient with reported right lower quadrant pain.  Further evaluation can be obtained with pelvic ultrasound.    Hepatomegaly.    Electronically signed by resident: Stephanie Tovar  Date:    11/26/2024  Time:    07:38    Electronically signed by: Stu Landry Jr  Date:    11/26/2024  Time:    08:27               Narrative:    EXAMINATION:  CT ABDOMEN PELVIS WITH IV CONTRAST    CLINICAL HISTORY:  Abdominal abscess/infection suspected;RLQ abdominal pain (Age >= 14y);    TECHNIQUE:  Axial images of the abdomen and pelvis were acquired after the use of 75 cc Iohl244 IV contrast.  Coronal and sagittal reconstructions were also obtained.    COMPARISON:  CT lumbar  spine 04/16/2018    FINDINGS:  Heart: Normal in size. No pericardial effusion.    Lungs: Mild dependent atelectasis.  No focal consolidation or pleural effusion.    Liver: Enlarged, measuring 20.2 cm.  No focal hepatic lesion.    Gallbladder: No calcified gallstones.    Bile Ducts: No evidence of dilated ducts.    Pancreas: No mass or peripancreatic fat stranding.    Spleen: Unremarkable.    Stomach and duodenum: Unremarkable.    Adrenals: Unremarkable.    Kidneys/Ureters: Normal in size and location. Normal enhancement. No hydronephrosis or nephrolithiasis. Ureters are not well visualized in their entirety.  No overt ureteral dilatation.    Bladder: No evidence of wall thickening.    Reproductive organs: Mild asymmetric enlargement of the right ovary.  Follicle is noted on the left.    Bowel/Mesentery: Small bowel is normal in caliber with no evidence of obstruction. No evidence of inflammation or wall thickening.  Colon demonstrates no focal wall thickening. The appendix is normal.    Peritoneum: Trace pelvic free fluid, likely physiologic.  No intraperitoneal free air.    Lymph nodes: No retroperitoneal lymphadenopathy.    Vasculature: No aneurysm. No significant calcific atherosclerosis.    Abdominal wall: Unremarkable.    Bones: No acute fracture. No suspicious osseous lesions.  Few sclerotic foci in pelvis, likely bone islands.

## 2024-11-26 NOTE — ED NOTES
I-STAT Chem-8+ Results:   Value Reference Range   Sodium 136 136-145 mmol/L   Potassium  3.7 3.5-5.1 mmol/L   Chloride 99  mmol/L   Ionized Calcium 1.18 1.06-1.42 mmol/L   CO2 (measured) 26 23-29 mmol/L   Glucose 66  mg/dL   BUN 14 6-30 mg/dL   Creatinine 0.6 0.5-1.4 mg/dL   Hematocrit 28 36-54%

## 2024-11-26 NOTE — ED NOTES
Assumed care for pt after recieving report from nightshift RN. Pt. resting in bed in NAD, RR e/u. Vital signs stable and within desired limits at this time of assessment. Pt. offered bathroom assistance and denies need at this time. Explanation of care/wait provided. Pt verbalizes no needs at this time. Bed in low, locked position with rails up and call bell in reach. Pt's white board updated with today's care team and plan.     Patient identifiers for Kavya Bundy 34 y.o. female checked and correct.  Chief Complaint   Patient presents with    Abdominal Pain     Arrives via EMS for RLQ abdominal pain & N/V x4 hrs.     Past Medical History:   Diagnosis Date    Hx of intravenous drug use in remission     Long-term current use of methadone for opiate dependence      Allergies reported: Review of patient's allergies indicates:  No Known Allergies      LOC: Patient is awake, alert, and aware of environment with an appropriate affect. Patient is oriented x 4 and speaking appropriately.  APPEARANCE: Patient resting comfortably and in no acute distress. Patient is clean and well groomed, patient's clothing is properly fastened.  HEENT: WDL  SKIN: The skin is warm and dry. Patient has normal skin turgor and moist mucus membranes.   MUSCULOSKELETAL: Patient is moving all extremities well, no obvious deformities noted. Pulses intact.   RESPIRATORY: Airway is open and patent. Respirations are spontaneous and non-labored with normal effort and rate.  CARDIAC: Patient has a normal rate and rhythm. 70 on cardiac monitor. No peripheral edema noted. Denies chest pain and SOB at at time of assessment.   ABDOMEN: No distention noted. Soft and non-tender upon palpation. Pt endoses diffuse abd pain, worse on right lower quadrant   NEUROLOGICAL: pupils 2mm, PERRL. Facial expression is symmetrical. Hand grasps are equal bilaterally. Normal sensation in all extremities when touched with finger.

## 2024-11-26 NOTE — ASSESSMENT & PLAN NOTE
- BP 90/50s  - I/s/o poor oral intake  - s/p 1 L NS bolus  - encourage oral fluid intake  - continue to monitor BP

## 2024-11-26 NOTE — ASSESSMENT & PLAN NOTE
Dangers of cigarette smoking were reviewed with patient in detail for 10 minutes and patient was encouraged to quit. Nicotine replacement options were discussed.

## 2024-11-26 NOTE — PROVIDER PROGRESS NOTES - EMERGENCY DEPT.
Encounter Date: 11/26/2024    ED Physician Progress Notes        Physician Note:   I took over as the sole provider when the resident left at 3:00 a.m.    Of note partner does admit that they occasionally use IV drugs last a couple weeks ago.    Her partner was in the room when I saw the patient he feels that she is slightly confused.  Brought in the workup to a full septic workup.    Labs only remarkable for elevated white count.    Covered with broad-spectrum antibiotics and IV fluids.  Patient does appear to be improving with IV fluids and antibiotics.    Patient and family member are asking about her methadone dose.  She was on 110mg, daily in the last time she got it was yesterday.  Her dose was verified with her methadone clinic.  However given her initial drowsiness will avoid the full dose of methadone as this is a very large dose we will start with 60 mg oral this will help with her pain control and avoid over-sedation.  Will also prevent withdrawal.    Discussed undertaken pelvic exam with patient she is currently declining pelvic exam.  She says she has not been sexually active in over 1 month.  Denies dysuria, frequency, abnormal vaginal discharge.    Pending CT abdomen pelvis, if no dangerous surgical abdominal pathology on the CT scan will admit for IV antibiotics for nonspecific sepsis, draining shin abscess, borderline low blood pressures, abdominal pain.     Signed out to day team pending CT scan and disposition.

## 2024-11-26 NOTE — CARE UPDATE
[facility] Care Support Interaction      I have reviewed the chart of Kavya Bundy who is hospitalized for Intractable lower abdominal pain. The patient is currently located in the following unit: ED     I have seen and examined the patient and provided the following support:     Clinical support - made changes to the plan in collaboration with the primary team as follows: changed US to transabdominal given patient unable to tolerate transvaginal. Bladder scan was ordered but patient was able to urinate approximately 400 cc  Proactive rounds - patient stabilized with IVF       Hermilo Guerin PA-C  Unit [facility]

## 2024-11-27 PROBLEM — D62 ACUTE BLOOD LOSS ANEMIA: Status: ACTIVE | Noted: 2024-11-27

## 2024-11-27 LAB
ABO + RH BLD: NORMAL
ANION GAP SERPL CALC-SCNC: 6 MMOL/L (ref 8–16)
ANISOCYTOSIS BLD QL SMEAR: SLIGHT
BASOPHILS # BLD AUTO: ABNORMAL K/UL (ref 0–0.2)
BASOPHILS NFR BLD: 0 % (ref 0–1.9)
BLD GP AB SCN CELLS X3 SERPL QL: NORMAL
BLD PROD TYP BPU: NORMAL
BLOOD UNIT EXPIRATION DATE: NORMAL
BLOOD UNIT TYPE CODE: 600
BLOOD UNIT TYPE: NORMAL
BUN SERPL-MCNC: 9 MG/DL (ref 6–20)
CALCIUM SERPL-MCNC: 8 MG/DL (ref 8.7–10.5)
CHLORIDE SERPL-SCNC: 110 MMOL/L (ref 95–110)
CO2 SERPL-SCNC: 22 MMOL/L (ref 23–29)
CODING SYSTEM: NORMAL
CREAT SERPL-MCNC: 0.6 MG/DL (ref 0.5–1.4)
CROSSMATCH INTERPRETATION: NORMAL
DIFFERENTIAL METHOD BLD: ABNORMAL
DISPENSE STATUS: NORMAL
EOSINOPHIL # BLD AUTO: ABNORMAL K/UL (ref 0–0.5)
EOSINOPHIL NFR BLD: 1 % (ref 0–8)
ERYTHROCYTE [DISTWIDTH] IN BLOOD BY AUTOMATED COUNT: 16.5 % (ref 11.5–14.5)
EST. GFR  (NO RACE VARIABLE): >60 ML/MIN/1.73 M^2
GLUCOSE SERPL-MCNC: 61 MG/DL (ref 70–110)
HCT VFR BLD AUTO: 21.4 % (ref 37–48.5)
HGB BLD-MCNC: 6.5 G/DL (ref 12–16)
HYPOCHROMIA BLD QL SMEAR: ABNORMAL
IMM GRANULOCYTES # BLD AUTO: ABNORMAL K/UL (ref 0–0.04)
IMM GRANULOCYTES NFR BLD AUTO: ABNORMAL % (ref 0–0.5)
LYMPHOCYTES # BLD AUTO: ABNORMAL K/UL (ref 1–4.8)
LYMPHOCYTES NFR BLD: 9 % (ref 18–48)
MAGNESIUM SERPL-MCNC: 2.1 MG/DL (ref 1.6–2.6)
MCH RBC QN AUTO: 19 PG (ref 27–31)
MCHC RBC AUTO-ENTMCNC: 30.4 G/DL (ref 32–36)
MCV RBC AUTO: 62 FL (ref 82–98)
MONOCYTES # BLD AUTO: ABNORMAL K/UL (ref 0.3–1)
MONOCYTES NFR BLD: 2 % (ref 4–15)
NEUTROPHILS # BLD AUTO: ABNORMAL K/UL (ref 1.8–7.7)
NEUTROPHILS NFR BLD: 88 % (ref 38–73)
NRBC BLD-RTO: 0 /100 WBC
NUM UNITS TRANS PACKED RBC: NORMAL
OVALOCYTES BLD QL SMEAR: ABNORMAL
PLATELET # BLD AUTO: 207 K/UL (ref 150–450)
PMV BLD AUTO: ABNORMAL FL (ref 9.2–12.9)
POIKILOCYTOSIS BLD QL SMEAR: SLIGHT
POLYCHROMASIA BLD QL SMEAR: ABNORMAL
POTASSIUM SERPL-SCNC: 3.9 MMOL/L (ref 3.5–5.1)
RBC # BLD AUTO: 3.43 M/UL (ref 4–5.4)
SODIUM SERPL-SCNC: 138 MMOL/L (ref 136–145)
SPECIMEN OUTDATE: NORMAL
WBC # BLD AUTO: 14.81 K/UL (ref 3.9–12.7)

## 2024-11-27 PROCEDURE — 86920 COMPATIBILITY TEST SPIN: CPT

## 2024-11-27 PROCEDURE — 83735 ASSAY OF MAGNESIUM: CPT | Performed by: PHYSICIAN ASSISTANT

## 2024-11-27 PROCEDURE — 20600001 HC STEP DOWN PRIVATE ROOM

## 2024-11-27 PROCEDURE — 85025 COMPLETE CBC W/AUTO DIFF WBC: CPT

## 2024-11-27 PROCEDURE — 94799 UNLISTED PULMONARY SVC/PX: CPT

## 2024-11-27 PROCEDURE — A4216 STERILE WATER/SALINE, 10 ML: HCPCS | Performed by: PHYSICIAN ASSISTANT

## 2024-11-27 PROCEDURE — 99900035 HC TECH TIME PER 15 MIN (STAT)

## 2024-11-27 PROCEDURE — P9016 RBC LEUKOCYTES REDUCED: HCPCS

## 2024-11-27 PROCEDURE — 36415 COLL VENOUS BLD VENIPUNCTURE: CPT

## 2024-11-27 PROCEDURE — 63600175 PHARM REV CODE 636 W HCPCS

## 2024-11-27 PROCEDURE — 80048 BASIC METABOLIC PNL TOTAL CA: CPT | Performed by: PHYSICIAN ASSISTANT

## 2024-11-27 PROCEDURE — 86850 RBC ANTIBODY SCREEN: CPT

## 2024-11-27 PROCEDURE — 63600175 PHARM REV CODE 636 W HCPCS: Performed by: PHYSICIAN ASSISTANT

## 2024-11-27 PROCEDURE — 25000003 PHARM REV CODE 250: Performed by: PHYSICIAN ASSISTANT

## 2024-11-27 PROCEDURE — 36430 TRANSFUSION BLD/BLD COMPNT: CPT

## 2024-11-27 PROCEDURE — 25000003 PHARM REV CODE 250

## 2024-11-27 PROCEDURE — 36415 COLL VENOUS BLD VENIPUNCTURE: CPT | Performed by: PHYSICIAN ASSISTANT

## 2024-11-27 PROCEDURE — 94761 N-INVAS EAR/PLS OXIMETRY MLT: CPT

## 2024-11-27 RX ORDER — HYDROCODONE BITARTRATE AND ACETAMINOPHEN 500; 5 MG/1; MG/1
TABLET ORAL
Status: DISCONTINUED | OUTPATIENT
Start: 2024-11-27 | End: 2024-12-03 | Stop reason: HOSPADM

## 2024-11-27 RX ORDER — ALUMINUM HYDROXIDE, MAGNESIUM HYDROXIDE, AND SIMETHICONE 2400; 240; 2400 MG/30ML; MG/30ML; MG/30ML
30 SUSPENSION ORAL
Status: DISCONTINUED | OUTPATIENT
Start: 2024-11-27 | End: 2024-12-03 | Stop reason: HOSPADM

## 2024-11-27 RX ADMIN — ALUMINUM HYDROXIDE, MAGNESIUM HYDROXIDE, AND DIMETHICONE 30 ML: 400; 400; 40 SUSPENSION ORAL at 05:11

## 2024-11-27 RX ADMIN — PIPERACILLIN SODIUM AND TAZOBACTAM SODIUM 4.5 G: 4; .5 INJECTION, POWDER, FOR SOLUTION INTRAVENOUS at 01:11

## 2024-11-27 RX ADMIN — ACETAMINOPHEN 1000 MG: 500 TABLET ORAL at 09:11

## 2024-11-27 RX ADMIN — METHADONE HYDROCHLORIDE 110 MG: 10 TABLET ORAL at 09:11

## 2024-11-27 RX ADMIN — ACETAMINOPHEN 1000 MG: 500 TABLET ORAL at 03:11

## 2024-11-27 RX ADMIN — Medication 10 ML: at 02:11

## 2024-11-27 RX ADMIN — Medication 10 ML: at 05:11

## 2024-11-27 RX ADMIN — KETOROLAC TROMETHAMINE 15 MG: 30 INJECTION, SOLUTION INTRAMUSCULAR; INTRAVENOUS at 09:11

## 2024-11-27 RX ADMIN — PIPERACILLIN SODIUM AND TAZOBACTAM SODIUM 4.5 G: 4; .5 INJECTION, POWDER, FOR SOLUTION INTRAVENOUS at 05:11

## 2024-11-27 RX ADMIN — ONDANSETRON 8 MG: 8 TABLET, ORALLY DISINTEGRATING ORAL at 08:11

## 2024-11-27 RX ADMIN — KETOROLAC TROMETHAMINE 15 MG: 30 INJECTION, SOLUTION INTRAMUSCULAR; INTRAVENOUS at 04:11

## 2024-11-27 RX ADMIN — Medication 10 ML: at 10:11

## 2024-11-27 RX ADMIN — ACETAMINOPHEN 1000 MG: 500 TABLET ORAL at 05:11

## 2024-11-27 RX ADMIN — PIPERACILLIN SODIUM AND TAZOBACTAM SODIUM 4.5 G: 4; .5 INJECTION, POWDER, FOR SOLUTION INTRAVENOUS at 09:11

## 2024-11-27 NOTE — CARE UPDATE
[facility] Care Support Interaction      I have reviewed the chart of Kavya Bundy who is hospitalized for Intractable lower abdominal pain. The patient is currently located in the following unit: ED - [facility]    I have seen and examined the patient and provided the following support:     Proactive rounds - soft blood pressure since admission, being monitored closely.   Received additional 500 cc bolus (total 2 L since admission) - BP improved to 96/57, MAP 70.  Continue to monitor.     8:12 PM   Repeat BP 90/52 > 90/54   Notified night team - will give additional 1L IVFs   Continue to monitor      Prema Ayers PA-C  [facility]

## 2024-11-27 NOTE — ASSESSMENT & PLAN NOTE
- BP 90/50s  - I/s/o poor oral intake  - encourage oral fluid intake  - given several liters of fluid, plan for 1 unit PRBC 11/27. Should also help with pressures

## 2024-11-27 NOTE — PROGRESS NOTES
[facility] ([place] [id number])  [facility]  Progress Note    Patient Name: Kavya Bundy  MRN: [id number]  Patient Class: OP- Observation   Admission Date: 11/26/2024  Length of Stay: 0 days  Attending Physician: Chriss Vazquez DO  Primary Care Provider: Otilia, Primary Doctor        Subjective:     Principal Problem:Intractable lower abdominal pain        HPI:  Ms. Sweet has a history of IVDU in remission and long term use of methadone at a clinic, recurrent leg abscesses, last seen in June 2024 for an abscess, incision and drainage performed, cultures grew strep intermedius, prescribed bactrim, and now presents for abdominal pain and tenderness along with vomiting. The patient reports abdominal pain started yesterday evening approx 5 hours prior to arrival. She had sudden sharp, stabbing pain in her right lower quadrant. Shortly after the pain began, she experienced intractable vomiting. Her spouse called EMS. She endorses headache and dizziness but no chest pain or palpitations. Two days prior, the patient reported having a fever. She remembered that one of her temperatures were greater than 100. Additionally she reports an abscess on the right lower leg that is purulent and painful. Her spouse says she has recurrent abscesses often and does not complete the antibiotic regimen prescribed. She is currently on her menses. She denied dysuria and hematuria. Her spouse says she was concerned for a UTI due to dysuria two weeks prior to tonight's presentation. Last bowel movement was 11/24.     In ED, Pt afebrile w/ soft BP 94/51, other VSS on RA. WBC 16.28. Hgb 7.9. No significant electrolyte abnormalities. UA noninfectious but w/ 26 RBC. CT a/p: Mild asymmetric enlargement of the right ovary of uncertain clinical significance in this patient with reported right lower quadrant pain. Given IV morphine, IV antiemetics, zosyn and vancomycin and IVF.     Overview/Hospital Course:  Patient admitted for  abdominal pain, no clear cause or source identified. Continue with pain management plan. She was anemic, possibly from menstrual cycles as she has not been having melena or bloody emesis.     Interval History: NAEO. Continued drop in H/H from overnight. Plans for transfusions today and monitoring response. Continues to have abdominal pain    Review of Systems   Constitutional:  Negative for fatigue and fever.   Respiratory:  Negative for shortness of breath.    Cardiovascular:  Negative for chest pain.   Gastrointestinal:  Positive for abdominal pain and constipation. Negative for nausea and vomiting.     Objective:     Vital Signs (Most Recent):  Temp: 98.2 °F (36.8 °C) (11/27/24 1524)  Pulse: 71 (11/27/24 1524)  Resp: 20 (11/27/24 1524)  BP: (!) 95/52 (11/27/24 1524)  SpO2: 100 % (11/27/24 1524) Vital Signs (24h Range):  Temp:  [98.2 °F (36.8 °C)-98.8 °F (37.1 °C)] 98.2 °F (36.8 °C)  Pulse:  [65-78] 71  Resp:  [15-22] 20  SpO2:  [98 %-100 %] 100 %  BP: (90-98)/(52-56) 95/52     Weight: 60 kg (132 lb 4.4 oz)  Body mass index is 20.72 kg/m².    Intake/Output Summary (Last 24 hours) at 11/27/2024 1643  Last data filed at 11/27/2024 0504  Gross per 24 hour   Intake 1192.85 ml   Output 400 ml   Net 792.85 ml         Physical Exam  Constitutional:       Appearance: Normal appearance.   HENT:      Head: Normocephalic.   Cardiovascular:      Rate and Rhythm: Normal rate.      Pulses: Normal pulses.      Heart sounds: No murmur heard.  Pulmonary:      Effort: Pulmonary effort is normal. No respiratory distress.      Breath sounds: Normal breath sounds.   Abdominal:      General: Abdomen is flat. Bowel sounds are normal. There is no distension.      Palpations: Abdomen is soft.      Tenderness: There is no abdominal tenderness.   Skin:     General: Skin is warm and dry.   Neurological:      Mental Status: She is alert.             Significant Labs: All pertinent labs within the past 24 hours have been  reviewed.    Significant Imaging: I have reviewed all pertinent imaging results/findings within the past 24 hours.    Assessment/Plan:      * Intractable lower abdominal pain  Nausea and vomiting    - afebrile VSS on RA  - WBC 16.28, trending  No significant electrolyte abnormalities  - UA noninfectious but w/ 26 RBC  - CT a/p: Mild asymmetric enlargement of the right ovary of uncertain clinical significance in this patient with reported right lower quadrant pain.  - pelvic US without any significant findings   - MM pain regimen: tylenol 1 g q8h, toradol 15 q6h prn  - prn antiemetics  - continue zosyn   - no clear cause of her pain. Was on clear liquid diet and request advance in diet. Trial of regular diet today.     Acute blood loss anemia  Anemia is likely due to chronic blood loss and menstrual cycles . Most recent hemoglobin and hematocrit are listed below.  Recent Labs     11/26/24  0250 11/26/24  0339 11/27/24  1345   HGB 7.9*  --  6.5*   HCT 26.8* 28* 21.4*     Plan  - Monitor serial CBC: Daily  - Transfuse PRBC if patient becomes hemodynamically unstable, symptomatic or H/H drops below 7/21.  - Patient has received 1 units of PRBCs on 11/27  - Patient's anemia is currently stable  - no signs of GI losses (constipated and not throwing up)  - suspect due to possible infection, IV fluids and continued menstrual cycle bleeding  - if abdominal pain does not improve and H/H drops, considering GI consultation    Tobacco abuse  Dangers of cigarette smoking were reviewed with patient in detail for 10 minutes and patient was encouraged to quit. Nicotine replacement options were discussed.    Leg abscess  - WBC 16, trending  - continue vanc and zosyn  - wound care consulted    Hypotension  - BP 90/50s  - I/s/o poor oral intake  - encourage oral fluid intake  - given several liters of fluid, plan for 1 unit PRBC 11/27. Should also help with pressures    Nausea and vomiting  No longer present, has not happened since  admission      Polysubstance abuse  - denies any recent drug use.   - methadone 110 mg qd, follows with methadone clinic   - consider addiction psych consult if not improving      VTE Risk Mitigation (From admission, onward)           Ordered     IP VTE LOW RISK PATIENT  Once         11/26/24 0931     Place CHERYL hose  Until discontinued         11/26/24 0931     Place sequential compression device  Until discontinued         11/26/24 0931     Place sequential compression device  Until discontinued         11/26/24 0931                    Discharge Planning   ABHINAV: 11/28/2024     Code Status: Full Code   Is the patient medically ready for discharge?:     Reason for patient still in hospital (select all that apply): Patient trending condition  Discharge Plan A: Home with family                  Chriss Vazquez DO  Department of Hospital Medicine   [facility] ([place])     [phone]

## 2024-11-27 NOTE — PLAN OF CARE
Pt is medicated for RLQ abdominal pain with Toradol IV push with effect. On Iv antibiotics with no adverse effect. Fall precautions reinforced. Will continue for plan of care.       Problem: Adult Inpatient Plan of Care  Goal: Plan of Care Review  Outcome: Progressing  Goal: Patient-Specific Goal (Individualized)  Outcome: Progressing  Goal: Absence of Hospital-Acquired Illness or Injury  Outcome: Progressing  Goal: Optimal Comfort and Wellbeing  Outcome: Progressing  Goal: Readiness for Transition of Care  Outcome: Progressing     Problem: Infection  Goal: Absence of Infection Signs and Symptoms  Outcome: Progressing     Problem: Wound  Goal: Optimal Coping  Outcome: Progressing  Goal: Optimal Functional Ability  Outcome: Progressing  Goal: Absence of Infection Signs and Symptoms  Outcome: Progressing  Goal: Improved Oral Intake  Outcome: Progressing  Goal: Optimal Pain Control and Function  Outcome: Progressing  Goal: Skin Health and Integrity  Outcome: Progressing  Goal: Optimal Wound Healing  Outcome: Progressing     Problem: Pain Acute  Goal: Optimal Pain Control and Function  Outcome: Progressing     Problem: Nausea and Vomiting  Goal: Nausea and Vomiting Relief  Outcome: Progressing     Problem: Fall Injury Risk  Goal: Absence of Fall and Fall-Related Injury  Outcome: Progressing

## 2024-11-27 NOTE — NURSING
Admitted from ED per stretcher AAOx4 on room air. Pt was admitted for RLQ abdomen sharp and stabbing pain with n/v 2x days. Pt was oriented to room and unit routine. Placed on cardiac monitor. Educated for safety and fall precautions. PT verbalizes understandings. Call light and personal belongings place within easy reach. Will continue with plan of care.

## 2024-11-27 NOTE — ASSESSMENT & PLAN NOTE
Nausea and vomiting    - afebrile VSS on RA  - WBC 16.28, trending  No significant electrolyte abnormalities  - UA noninfectious but w/ 26 RBC  - CT a/p: Mild asymmetric enlargement of the right ovary of uncertain clinical significance in this patient with reported right lower quadrant pain.  - pelvic US without any significant findings   - MM pain regimen: tylenol 1 g q8h, toradol 15 q6h prn  - prn antiemetics  - continue zosyn   - no clear cause of her pain. Was on clear liquid diet and request advance in diet. Trial of regular diet today.

## 2024-11-27 NOTE — HOSPITAL COURSE
Patient admitted for abdominal pain, no clear cause or source identified. CT abdomen pelvis and Pelvic US reassuring Continue with pain management plan and abx for possible skin/soft tissue infection. She was anemic, possibly from menstrual cycles as there were no signs of bleeding per rectum or hematemesis. She continued to require multiple transfusions due to anemia and little improvement in abdominal pain. Bowel regimen was increased. Gastroenterology consutled and felt pain likely multifactorial but recommended EGD due to anemia and increased NSAID use. EGD performed 12/2/24 and reassuring. Patient started having bowel movements 12/2/24 with improvement in symptoms. Discussed OBGYN and Gastroenterology follow up outpatient with patient. PCP also arranged for discharge. Given history of constipation discharged home with daily miralax to titrate to one soft stool per day.     Pt deemed appropriate for discharge; seen and examined prior to departure. Plan discussed with pt, who was agreeable and amenable; medications were discussed and reviewed, outpatient follow-up scheduled, ER precautions were given, all questions were answered to the pt's satisfaction, and she was subsequently discharged.

## 2024-11-27 NOTE — PLAN OF CARE
Problem: Adult Inpatient Plan of Care  Goal: Plan of Care Review  Outcome: Progressing  Goal: Patient-Specific Goal (Individualized)  Outcome: Progressing  Goal: Absence of Hospital-Acquired Illness or Injury  Outcome: Progressing  Goal: Optimal Comfort and Wellbeing  Outcome: Progressing  Goal: Readiness for Transition of Care  Outcome: Progressing     Problem: Infection  Goal: Absence of Infection Signs and Symptoms  Outcome: Progressing     Problem: Wound  Goal: Optimal Coping  Outcome: Progressing  Goal: Optimal Functional Ability  Outcome: Progressing  Goal: Absence of Infection Signs and Symptoms  Outcome: Progressing  Goal: Improved Oral Intake  Outcome: Progressing  Goal: Optimal Pain Control and Function  Outcome: Progressing  Goal: Skin Health and Integrity  Outcome: Progressing  Goal: Optimal Wound Healing  Outcome: Progressing     Problem: Pain Acute  Goal: Optimal Pain Control and Function  Outcome: Progressing     Problem: Nausea and Vomiting  Goal: Nausea and Vomiting Relief  Outcome: Progressing     Problem: Fall Injury Risk  Goal: Absence of Fall and Fall-Related Injury  Outcome: Progressing

## 2024-11-27 NOTE — ED NOTES
Telemetry Verification   Patient placed on Telemetry Box  Verified with War Room  Tech Aclark RN   Box #    Rate 68   Rhythm NS

## 2024-11-27 NOTE — CARE UPDATE
"RAPID RESPONSE NURSE CHART REVIEW        Chart Reviewed: 11/27/2024, 10:15 AM    MRN: [id number]  Bed: 44432/22864 A    Dx: Intractable lower abdominal pain    Kavya Bundy has a past medical history of intravenous drug use in remission and Long-term current use of methadone for opiate dependence.    Last VS: BP (!) 95/52 (BP Location: Right arm, Patient Position: Lying)   Pulse 72   Temp 98.2 °F (36.8 °C) (Oral)   Resp 20   Ht 5' 7" (1.702 m)   Wt 60 kg (132 lb 4.4 oz)   SpO2 99%   Breastfeeding No   BMI 20.72 kg/m²     24H Vital Sign Range:  Temp:  [98.2 °F (36.8 °C)-99.1 °F (37.3 °C)]   Pulse:  [65-78]   Resp:  [15-22]   BP: (87-98)/(50-57)   SpO2:  [98 %-100 %]     Level of Consciousness (AVPU): alert    Recent Labs     11/26/24  0250 11/26/24  0339   WBC 16.28*  --    HGB 7.9*  --    HCT 26.8* 28*     --        Recent Labs     11/26/24  0250 11/26/24  2040 11/27/24  0409    137 138   K 3.8 3.5 3.9    107 110   CO2 24 24 22*   BUN 13 9 9   CREATININE 0.6 0.7 0.6   GLU 72 89 61*   MG  --   --  2.1        Recent Labs     11/26/24  0355   PH 7.376   PCO2 45.0   PO2 92*   HCO3 26.4   POCSATURATED 97   BE 1             MEWS score: 3    Rounding completed with charge RAVIN Alexandra reports patient hypotensive overnight.Responded well to fluid resuscitation. Vss this morning. No additional concerns verbalized at this time. Instructed to call [phone] for further concerns or assistance.    Patrice Ochoa RN      "

## 2024-11-27 NOTE — ASSESSMENT & PLAN NOTE
- denies any recent drug use.   - methadone 110 mg qd, follows with methadone clinic   - consider addiction psych consult if not improving

## 2024-11-27 NOTE — NURSING
Shift Note    Pt rested well this shift. VSS. Soft BP's. Pain managed with PRN's. Pt requesting upgraded diet for supper. Monitoring for increased ABD pain. Skin care completed. T&R per self as tolerated. Safety precautions in place. Call light in reach. No further concerns noted at this time.     Pt receiving 1 unit PRBC's at this time.

## 2024-11-27 NOTE — CONSULTS
[facility] - [facility] ([place] [address])    Wound Care     Patient Name:  Kavya Bundy  MRN:  [id number]  Date: 11/27/2024  Diagnosis: Intractable lower abdominal pain     History:  Past Medical History:   Diagnosis Date    Hx of intravenous drug use in remission     Long-term current use of methadone for opiate dependence      Social History     Socioeconomic History    Marital status:    Tobacco Use    Smoking status: Every Day     Current packs/day: 0.50     Average packs/day: 0.5 packs/day for 4.0 years (2.0 ttl pk-yrs)     Types: Cigarettes    Smokeless tobacco: Never   Substance and Sexual Activity    Alcohol use: No    Drug use: No     Types: IV, Heroin, Cocaine, Amphetamines    Sexual activity: Yes     Partners: Male     Social Drivers of Health     Financial Resource Strain: Low Risk  (11/26/2024)    Overall Financial Resource Strain (CARDIA)     Difficulty of Paying Living Expenses: Not hard at all   Food Insecurity: No Food Insecurity (11/26/2024)    Hunger Vital Sign     Worried About Running Out of Food in the Last Year: Never true     Ran Out of Food in the Last Year: Never true   Transportation Needs: No Transportation Needs (11/26/2024)    TRANSPORTATION NEEDS     Transportation : No   Stress: No Stress Concern Present (11/26/2024)    [employer] [place] [employer] - Occupational Stress Questionnaire     Feeling of Stress : Not at all   Housing Stability: Low Risk  (11/26/2024)    Housing Stability Vital Sign     Unable to Pay for Housing in the Last Year: No     Homeless in the Last Year: No     Precautions:  Allergies as of 11/26/2024    (No Known Allergies)       WOC Assessment Details / Treatment:    Patient seen for wound care: New Consult   Chart reviewed for this encounter.   Labs:   WBC (K/uL)   Date Value   11/27/2024 14.81 (H)   11/26/2024 16.28 (H)     Glucose (mg/dL)   Date Value   11/27/2024 61 (L)   11/26/2024 89     Albumin (g/dL)   Date Value   11/26/2024 3.4 (L)    05/06/2022 4.0       Porter Score: 21    Narrative:  Pt seen for WC consultation and agreed to assessment  Chart reviewed for this encounter.   See Flow Sheet for additional documentation and media.    Pt has RLE intact scabs, with scar tissue noted no s/s of infection, no cx of pain.  No open wounds.      RECOMMENDATIONS:  Bedside nurse assess for acute changes (purulence, increased redness/swelling, increased drainage, malodor, increased pain, pallor, necrosis) please contact physician on any acute changes.    Do not pick     Discussed POC with patient and primary nurse.   See EMR for orders & patient education.     Bedside nursing to continue care & monitoring.  Bedside nursing to maintain pressure injury prevention interventions    Recommendations made to primary team for above plan via secure chat.     Thank you for the consult. Wound Care will sign off.  Please place a new consult if needed.      11/27/24 1045   WOCN Assessment   WOCN Total Time (mins) 20   Visit Date 11/27/24   Visit Time 1045   Consult Type New   WOCN Speciality Wound   Wound other  (abscess)   Intervention chart review;assessed   Teaching on-going;complication        Wound 11/26/24 0141 Abscess Right lower Leg   Date First Assessed/Time First Assessed: 11/26/24 0141   Present on Original Admission: Yes  Primary Wound Type: Abscess  Side: Right  Orientation: lower  Location: Leg   Wound Image     Dressing Appearance Open to air   Drainage Amount None   Drainage Characteristics/Odor No odor   Appearance Intact;Dry;Fibrin   Tissue loss description Not applicable   Periwound Area Intact;Dry;Scar tissue

## 2024-11-27 NOTE — SUBJECTIVE & OBJECTIVE
Interval History: NAEO. Continued drop in H/H from overnight. Plans for transfusions today and monitoring response. Continues to have abdominal pain    Review of Systems   Constitutional:  Negative for fatigue and fever.   Respiratory:  Negative for shortness of breath.    Cardiovascular:  Negative for chest pain.   Gastrointestinal:  Positive for abdominal pain and constipation. Negative for nausea and vomiting.     Objective:     Vital Signs (Most Recent):  Temp: 98.2 °F (36.8 °C) (11/27/24 1524)  Pulse: 71 (11/27/24 1524)  Resp: 20 (11/27/24 1524)  BP: (!) 95/52 (11/27/24 1524)  SpO2: 100 % (11/27/24 1524) Vital Signs (24h Range):  Temp:  [98.2 °F (36.8 °C)-98.8 °F (37.1 °C)] 98.2 °F (36.8 °C)  Pulse:  [65-78] 71  Resp:  [15-22] 20  SpO2:  [98 %-100 %] 100 %  BP: (90-98)/(52-56) 95/52     Weight: 60 kg (132 lb 4.4 oz)  Body mass index is 20.72 kg/m².    Intake/Output Summary (Last 24 hours) at 11/27/2024 1643  Last data filed at 11/27/2024 0504  Gross per 24 hour   Intake 1192.85 ml   Output 400 ml   Net 792.85 ml         Physical Exam  Constitutional:       Appearance: Normal appearance.   HENT:      Head: Normocephalic.   Cardiovascular:      Rate and Rhythm: Normal rate.      Pulses: Normal pulses.      Heart sounds: No murmur heard.  Pulmonary:      Effort: Pulmonary effort is normal. No respiratory distress.      Breath sounds: Normal breath sounds.   Abdominal:      General: Abdomen is flat. Bowel sounds are normal. There is no distension.      Palpations: Abdomen is soft.      Tenderness: There is no abdominal tenderness.   Skin:     General: Skin is warm and dry.   Neurological:      Mental Status: She is alert.             Significant Labs: All pertinent labs within the past 24 hours have been reviewed.    Significant Imaging: I have reviewed all pertinent imaging results/findings within the past 24 hours.

## 2024-11-27 NOTE — ED NOTES
Pt care assumed. Report received by RAVIN Castle. Pt lying in stretcher in low and locked position and side rails raised x2. Call light, pt's belongings, and bedside table within pt's reach. Pt on continuous cardiac monitoring, pulse oximetry, and BP cycling every 4 hours. Pt in NAD and verbalized no needs at this time.

## 2024-11-27 NOTE — ASSESSMENT & PLAN NOTE
Anemia is likely due to chronic blood loss and menstrual cycles . Most recent hemoglobin and hematocrit are listed below.  Recent Labs     11/26/24  0250 11/26/24  0339 11/27/24  1345   HGB 7.9*  --  6.5*   HCT 26.8* 28* 21.4*     Plan  - Monitor serial CBC: Daily  - Transfuse PRBC if patient becomes hemodynamically unstable, symptomatic or H/H drops below 7/21.  - Patient has received 1 units of PRBCs on 11/27  - Patient's anemia is currently stable  - no signs of GI losses (constipated and not throwing up)  - suspect due to possible infection, IV fluids and continued menstrual cycle bleeding  - if abdominal pain does not improve and H/H drops, considering GI consultation

## 2024-11-28 PROBLEM — F17.200 TOBACCO DEPENDENCY: Status: ACTIVE | Noted: 2024-11-28

## 2024-11-28 LAB
BASOPHILS # BLD AUTO: 0.02 K/UL (ref 0–0.2)
BASOPHILS NFR BLD: 0.2 % (ref 0–1.9)
DIFFERENTIAL METHOD BLD: ABNORMAL
EOSINOPHIL # BLD AUTO: 0.1 K/UL (ref 0–0.5)
EOSINOPHIL NFR BLD: 0.8 % (ref 0–8)
ERYTHROCYTE [DISTWIDTH] IN BLOOD BY AUTOMATED COUNT: 18 % (ref 11.5–14.5)
FERRITIN SERPL-MCNC: 107 NG/ML (ref 20–300)
HCT VFR BLD AUTO: 22.2 % (ref 37–48.5)
HGB BLD-MCNC: 7 G/DL (ref 12–16)
IMM GRANULOCYTES # BLD AUTO: 0.04 K/UL (ref 0–0.04)
IMM GRANULOCYTES NFR BLD AUTO: 0.3 % (ref 0–0.5)
IRON SERPL-MCNC: 10 UG/DL (ref 30–160)
LYMPHOCYTES # BLD AUTO: 2 K/UL (ref 1–4.8)
LYMPHOCYTES NFR BLD: 16.7 % (ref 18–48)
MCH RBC QN AUTO: 20 PG (ref 27–31)
MCHC RBC AUTO-ENTMCNC: 31.5 G/DL (ref 32–36)
MCV RBC AUTO: 63 FL (ref 82–98)
MONOCYTES # BLD AUTO: 0.8 K/UL (ref 0.3–1)
MONOCYTES NFR BLD: 7 % (ref 4–15)
NEUTROPHILS # BLD AUTO: 8.9 K/UL (ref 1.8–7.7)
NEUTROPHILS NFR BLD: 75 % (ref 38–73)
NRBC BLD-RTO: 0 /100 WBC
PLATELET # BLD AUTO: 240 K/UL (ref 150–450)
PMV BLD AUTO: ABNORMAL FL (ref 9.2–12.9)
RBC # BLD AUTO: 3.5 M/UL (ref 4–5.4)
SATURATED IRON: 4 % (ref 20–50)
TOTAL IRON BINDING CAPACITY: 226 UG/DL (ref 250–450)
TRANSFERRIN SERPL-MCNC: 153 MG/DL (ref 200–375)
WBC # BLD AUTO: 11.92 K/UL (ref 3.9–12.7)

## 2024-11-28 PROCEDURE — 25000003 PHARM REV CODE 250: Performed by: STUDENT IN AN ORGANIZED HEALTH CARE EDUCATION/TRAINING PROGRAM

## 2024-11-28 PROCEDURE — 25000003 PHARM REV CODE 250: Performed by: PHYSICIAN ASSISTANT

## 2024-11-28 PROCEDURE — 84466 ASSAY OF TRANSFERRIN: CPT | Performed by: STUDENT IN AN ORGANIZED HEALTH CARE EDUCATION/TRAINING PROGRAM

## 2024-11-28 PROCEDURE — 85025 COMPLETE CBC W/AUTO DIFF WBC: CPT | Performed by: STUDENT IN AN ORGANIZED HEALTH CARE EDUCATION/TRAINING PROGRAM

## 2024-11-28 PROCEDURE — 25000003 PHARM REV CODE 250

## 2024-11-28 PROCEDURE — 63600175 PHARM REV CODE 636 W HCPCS: Performed by: PHYSICIAN ASSISTANT

## 2024-11-28 PROCEDURE — 36415 COLL VENOUS BLD VENIPUNCTURE: CPT | Performed by: STUDENT IN AN ORGANIZED HEALTH CARE EDUCATION/TRAINING PROGRAM

## 2024-11-28 PROCEDURE — 63600175 PHARM REV CODE 636 W HCPCS

## 2024-11-28 PROCEDURE — 82728 ASSAY OF FERRITIN: CPT | Performed by: STUDENT IN AN ORGANIZED HEALTH CARE EDUCATION/TRAINING PROGRAM

## 2024-11-28 PROCEDURE — A4216 STERILE WATER/SALINE, 10 ML: HCPCS | Performed by: PHYSICIAN ASSISTANT

## 2024-11-28 PROCEDURE — 20600001 HC STEP DOWN PRIVATE ROOM

## 2024-11-28 RX ORDER — LACTULOSE 10 G/15ML
15 SOLUTION ORAL DAILY
Status: DISCONTINUED | OUTPATIENT
Start: 2024-11-28 | End: 2024-11-28

## 2024-11-28 RX ORDER — POLYETHYLENE GLYCOL 3350 17 G/17G
17 POWDER, FOR SOLUTION ORAL DAILY
Status: DISCONTINUED | OUTPATIENT
Start: 2024-11-28 | End: 2024-12-03 | Stop reason: HOSPADM

## 2024-11-28 RX ORDER — LACTULOSE 10 G/15ML
15 SOLUTION ORAL 2 TIMES DAILY
Status: DISCONTINUED | OUTPATIENT
Start: 2024-11-28 | End: 2024-12-03 | Stop reason: HOSPADM

## 2024-11-28 RX ADMIN — POLYETHYLENE GLYCOL 3350 17 G: 17 POWDER, FOR SOLUTION ORAL at 12:11

## 2024-11-28 RX ADMIN — Medication 10 ML: at 06:11

## 2024-11-28 RX ADMIN — LACTULOSE 15 G: 20 SOLUTION ORAL at 08:11

## 2024-11-28 RX ADMIN — PIPERACILLIN SODIUM AND TAZOBACTAM SODIUM 4.5 G: 4; .5 INJECTION, POWDER, FOR SOLUTION INTRAVENOUS at 05:11

## 2024-11-28 RX ADMIN — KETOROLAC TROMETHAMINE 15 MG: 30 INJECTION, SOLUTION INTRAMUSCULAR; INTRAVENOUS at 06:11

## 2024-11-28 RX ADMIN — LACTULOSE 15 G: 20 SOLUTION ORAL at 12:11

## 2024-11-28 RX ADMIN — Medication 10 ML: at 02:11

## 2024-11-28 RX ADMIN — PIPERACILLIN SODIUM AND TAZOBACTAM SODIUM 4.5 G: 4; .5 INJECTION, POWDER, FOR SOLUTION INTRAVENOUS at 01:11

## 2024-11-28 RX ADMIN — PIPERACILLIN SODIUM AND TAZOBACTAM SODIUM 4.5 G: 4; .5 INJECTION, POWDER, FOR SOLUTION INTRAVENOUS at 08:11

## 2024-11-28 RX ADMIN — KETOROLAC TROMETHAMINE 15 MG: 30 INJECTION, SOLUTION INTRAMUSCULAR; INTRAVENOUS at 04:11

## 2024-11-28 RX ADMIN — KETOROLAC TROMETHAMINE 15 MG: 30 INJECTION, SOLUTION INTRAMUSCULAR; INTRAVENOUS at 12:11

## 2024-11-28 RX ADMIN — METHADONE HYDROCHLORIDE 110 MG: 10 TABLET ORAL at 08:11

## 2024-11-28 RX ADMIN — ALUMINUM HYDROXIDE, MAGNESIUM HYDROXIDE, AND DIMETHICONE 30 ML: 400; 400; 40 SUSPENSION ORAL at 12:11

## 2024-11-28 RX ADMIN — Medication 10 ML: at 08:11

## 2024-11-28 RX ADMIN — ALUMINUM HYDROXIDE, MAGNESIUM HYDROXIDE, AND DIMETHICONE 30 ML: 400; 400; 40 SUSPENSION ORAL at 07:11

## 2024-11-28 RX ADMIN — ACETAMINOPHEN 1000 MG: 500 TABLET ORAL at 12:11

## 2024-11-28 NOTE — ASSESSMENT & PLAN NOTE
- BP 90/50s  - I/s/o poor oral intake  - encourage oral fluid intake  - given several liters of fluid, plan for 1 unit PRBC 11/27.  Will continue to monitor

## 2024-11-28 NOTE — PROGRESS NOTES
[facility] - [facility]down [facility] ([place] [address])  [facility] Medicine  Progress Note    Patient Name: Kavya Bundy  MRN: [id number]  Patient Class: IP- Inpatient   Admission Date: 11/26/2024  Length of Stay: 1 days  Attending Physician: Stella Vila MD  Primary Care Provider: Otilia, Primary Doctor        Subjective:     Principal Problem:Intractable lower abdominal pain        HPI:  Ms. Sweet has a history of IVDU in remission and long term use of methadone at a clinic, recurrent leg abscesses, last seen in June 2024 for an abscess, incision and drainage performed, cultures grew strep intermedius, prescribed bactrim, and now presents for abdominal pain and tenderness along with vomiting. The patient reports abdominal pain started yesterday evening approx 5 hours prior to arrival. She had sudden sharp, stabbing pain in her right lower quadrant. Shortly after the pain began, she experienced intractable vomiting. Her spouse called EMS. She endorses headache and dizziness but no chest pain or palpitations. Two days prior, the patient reported having a fever. She remembered that one of her temperatures were greater than 100. Additionally she reports an abscess on the right lower leg that is purulent and painful. Her spouse says she has recurrent abscesses often and does not complete the antibiotic regimen prescribed. She is currently on her menses. She denied dysuria and hematuria. Her spouse says she was concerned for a UTI due to dysuria two weeks prior to tonight's presentation. Last bowel movement was 11/24.     In ED, Pt afebrile w/ soft BP 94/51, other VSS on RA. WBC 16.28. Hgb 7.9. No significant electrolyte abnormalities. UA noninfectious but w/ 26 RBC. CT a/p: Mild asymmetric enlargement of the right ovary of uncertain clinical significance in this patient with reported right lower quadrant pain. Given IV morphine, IV antiemetics, zosyn and vancomycin and IVF.     Overview/Hospital Course:  Patient admitted for  abdominal pain, no clear cause or source identified. Continue with pain management plan. She was anemic, possibly from menstrual cycles as she has not been having melena or bloody emesis.     Interval History: Pt seen and examined this morning on rounds. NAEON. Refused labs overnight. Still on menstrual cycle otherwise no signs of bleeding. Care plan reviewed. Otherwise, doing well and with no further complaints at this time.    Objective:     Vital Signs (Most Recent):  Temp: 98 °F (36.7 °C) (11/28/24 0800)  Pulse: 67 (11/28/24 1102)  Resp: 16 (11/28/24 0830)  BP: (!) 94/52 (11/28/24 0800)  SpO2: 98 % (11/28/24 1102) Vital Signs (24h Range):  Temp:  [98 °F (36.7 °C)-99.1 °F (37.3 °C)] 98 °F (36.7 °C)  Pulse:  [65-75] 67  Resp:  [16-23] 16  SpO2:  [97 %-100 %] 98 %  BP: ()/(52-59) 94/52     Weight: 60 kg (132 lb 4.4 oz)  Body mass index is 20.72 kg/m².    Intake/Output Summary (Last 24 hours) at 11/28/2024 1157  Last data filed at 11/28/2024 1110  Gross per 24 hour   Intake 240 ml   Output 300 ml   Net -60 ml         Physical Exam  Constitutional:       Appearance: Normal appearance.   HENT:      Head: Normocephalic and atraumatic.      Nose: Nose normal.      Mouth/Throat:      Mouth: Mucous membranes are moist.   Eyes:      Extraocular Movements: Extraocular movements intact.      Pupils: Pupils are equal, round, and reactive to light.   Cardiovascular:      Rate and Rhythm: Normal rate and regular rhythm.      Heart sounds: No murmur heard.     No gallop.   Pulmonary:      Effort: Pulmonary effort is normal.      Breath sounds: Normal breath sounds. No wheezing or rales.   Abdominal:      General: Abdomen is flat. Bowel sounds are normal. There is no distension.      Palpations: Abdomen is soft.      Tenderness: There is abdominal tenderness.   Musculoskeletal:         General: No swelling or tenderness. Normal range of motion.      Cervical back: Normal range of motion and neck supple.      Right lower  leg: No edema.      Left lower leg: No edema.   Skin:     General: Skin is warm and dry.      Capillary Refill: Capillary refill takes less than 2 seconds.   Neurological:      General: No focal deficit present.      Mental Status: She is alert. Mental status is at baseline.   Psychiatric:         Mood and Affect: Mood normal.             Significant Labs: All pertinent labs within the past 24 hours have been reviewed.    Significant Imaging: I have reviewed all pertinent imaging results/findings within the past 24 hours.    Assessment/Plan:      * Intractable lower abdominal pain  Nausea and vomiting    - afebrile VSS on RA  - WBC 16.28, trending  No significant electrolyte abnormalities  - UA noninfectious but w/ 26 RBC  - CT a/p: Mild asymmetric enlargement of the right ovary of uncertain clinical significance in this patient with reported right lower quadrant pain.  - pelvic US without any significant findings   - MM pain regimen: tylenol 1 g q8h, toradol 15 q6h prn  - prn antiemetics  - continue zosyn   - no clear cause of her pain. Advanced to regular diet and will re evaluate.     Nausea and vomiting  No longer present, has not happened since admission      Acute blood loss anemia  Anemia is likely due to chronic blood loss and menstrual cycles . Most recent hemoglobin and hematocrit are listed below.    Plan  - Monitor serial CBC: Daily  - Transfuse PRBC if patient becomes hemodynamically unstable, symptomatic or H/H drops below 7/21.  - Patient has received 1 units of PRBCs on 11/27 , repeat H&H Pending   - Patient's anemia is currently stable  - no signs of GI losses (constipated and not throwing up)  - suspect due to possible infection, IV fluids and continued menstrual cycle bleeding  - if abdominal pain does not improve and H/H continues to drop, considering GI consultation    Hypotension  - BP 90/50s  - I/s/o poor oral intake  - encourage oral fluid intake  - given several liters of fluid, plan for 1  unit PRBC 11/27.  Will continue to monitor     Leg abscess  - WBC 16, trending  - continue Zosyn   - wound care consulted    Tobacco abuse  Dangers of cigarette smoking were reviewed with patient in detail for 10 minutes and patient was encouraged to quit. Nicotine replacement options were discussed.    Polysubstance abuse  - denies any recent drug use.   - methadone 110 mg qd, follows with methadone clinic   - consider addiction psych consult if not improving      VTE Risk Mitigation (From admission, onward)           Ordered     IP VTE LOW RISK PATIENT  Once         11/26/24 0931     Place CHERYL hose  Until discontinued         11/26/24 0931     Place sequential compression device  Until discontinued         11/26/24 0931     Place sequential compression device  Until discontinued         11/26/24 0931                    Discharge Planning   ABHINAV: 12/2/2024     Code Status: Full Code   Is the patient medically ready for discharge?:     Reason for patient still in hospital (select all that apply): Treatment  Discharge Plan A: Home with family                  Stella Vila MD  Department of Hospital Medicine   [facility] ([place])

## 2024-11-28 NOTE — PLAN OF CARE
[facility] ([place])  Initial Discharge Assessment      Discharge Plan A and Plan B have been determined by review of patient's clinical status, future medical and therapeutic needs, and coverage/benefits for post-acute care in coordination with multidisciplinary team members.      Primary Care Provider: No, Primary Doctor    Admission Diagnosis: Chest pain [R07.9]  Right lower quadrant abdominal pain [R10.31]  Nausea and vomiting, unspecified vomiting type [R11.2]    Admission Date: 11/26/2024  Expected Discharge Date: 11/28/2024    Transition of Care Barriers: Substance Abuse    Payor: MEDICAID / Plan: [employer]ETNA [employer] [facility] / Product Type: Managed Medicaid /     Extended Emergency Contact Information  Primary Emergency Contact: Shelly Bundy   [facility]  Home Phone: [phone]  Mobile Phone: [phone]  Relation: Grandparent    Discharge Plan A: Home with family  Discharge Plan B: Home      [id number] - [place] - [address] AT [place] [address] [address] [place] [address]  Phone: [phone] Fax: [phone]    [facility] #[id number] - [place]  [address]  AT [facility] [address]  [address] [place] [address]  Phone: [phone] Fax: [phone]      Initial Assessment (most recent)       Adult Discharge Assessment - 11/27/24 1521          Discharge Assessment    Assessment Type Discharge Planning Assessment     Confirmed/corrected address, phone number and insurance Yes     Confirmed Demographics Correct on Facesheet     Source of Information patient     When was your last doctors appointment? --   no pcp    Does patient/caregiver understand observation status Yes     Reason For Admission Intractable lower abdominal pain     People in Home grandparent(s)     Facility Arrived From: home     Do you expect to return to your current living situation? Yes     Do you have  help at home or someone to help you manage your care at home? Yes     Who are your caregiver(s) and their phone number(s)? Shelly Bundy (Grandparent)  [phone] (Home Phone)     Prior to hospitilization cognitive status: Alert/Oriented;No Deficits     Current cognitive status: Alert/Oriented;No Deficits     Walking or Climbing Stairs Difficulty no     Dressing/Bathing Difficulty no     Home Accessibility wheelchair accessible     Equipment Currently Used at Home none     Readmission within 30 days? No     Patient currently being followed by outpatient case management? No     Do you currently have service(s) that help you manage your care at home? No     Do you take prescription medications? No     Do you have prescription coverage? Yes     Coverage MEDICAID - [facility] -     Do you have any problems affording any of your prescribed medications? No     Is the patient taking medications as prescribed? yes     Who is going to help you get home at discharge? Shelly Bundy (Grandparent)  [phone] (Home Phone)     How do you get to doctors appointments? family or friend will provide;health plan transportation     Are you on dialysis? No     Do you take coumadin? No     Discharge Plan A Home with family     Discharge Plan B Home     DME Needed Upon Discharge  none     Discharge Plan discussed with: Patient     Transition of Care Barriers Substance Abuse        Physical Activity    On average, how many days per week do you engage in moderate to strenuous exercise (like a brisk walk)? 7 days     On average, how many minutes do you engage in exercise at this level? 40 min        Financial Resource Strain    How hard is it for you to pay for the very basics like food, housing, medical care, and heating? Not hard at all        Housing Stability    In the last 12 months, was there a time when you were not able to pay the mortgage or rent on time? No     At any time in the past 12 months, were  you homeless or living in a shelter (including now)? No        Transportation Needs    Has the lack of transportation kept you from medical appointments, meetings, work or from getting things needed for daily living? No        Food Insecurity    Within the past 12 months, you worried that your food would run out before you got the money to buy more. Never true     Within the past 12 months, the food you bought just didn't last and you didn't have money to get more. Never true        Stress    Do you feel stress - tense, restless, nervous, or anxious, or unable to sleep at night because your mind is troubled all the time - these days? Not at all        Social Isolation    How often do you feel lonely or isolated from those around you?  Never        Alcohol Use    Q1: How often do you have a drink containing alcohol? Never     Q2: How many drinks containing alcohol do you have on a typical day when you are drinking? Patient does not drink     Q3: How often do you have six or more drinks on one occasion? Never        Utilities    In the past 12 months has the electric, gas, oil, or water company threatened to shut off services in your home? No        Health Literacy    How often do you need to have someone help you when you read instructions, pamphlets, or other written material from your doctor or pharmacy? Never        OTHER    Name(s) of People in Home Shelly Bundy (Grandparent)  [phone] (Home Phone)                          CM met with patient at bedside to complete discharge planning assessment.  Patient alert and oriented xs 4.  Patient verified all demographic information on facesheet is correct.  Patient verified  needing a PCP. CHW (Community Health Worker) will secure PCP.   Patient verified primary health insurance is [place] Medicaid. MEDICAID - [employer]ETNA [facility] -  Patient is agreeable with bedside medication delivery.   Patient is not active with  home health and has listed DME.  Patient  with NO POA or Living Will.  Patient not on dialysis or medication coumadin.  Patient with no 30 day admission.  Patient with no financial issues at this time.  Patients grand mother  will provide transportation upon discharge from facility.  Patient will have assistance from her grand mother if needed  upon discharge Patient independent with ADLs. All questions answered regarding Case Management Discharge Planning, patient verbalized understanding.  Discharge booklet with CM's contact information given to patient.   Case Management will continue to follow and assist with discharge planning.       30 day Readmit   yes     no  (x)    Referrals:  My Acute Care at Home  [] yes  [ x] no      OPCM  [ ] yes [ x] no           Savanna Perez RN  Case Management  [facility]  [phone]

## 2024-11-28 NOTE — PLAN OF CARE
Pt slept well this shift. A&Ox 4. VSS. On RA. NSR on tele. BP's soft this shift. Refusing lab draws this shift. Up independently. Medicated for pain and nausea. Clear liquid diet. IV abx. L leg abscess. Safety precautions in place. Call light and personal items in reach. No further concerns noted at this time. No acute distress noted this shift. POC ongoing.     Problem: Adult Inpatient Plan of Care  Goal: Plan of Care Review  Outcome: Progressing  Goal: Patient-Specific Goal (Individualized)  Outcome: Progressing  Goal: Absence of Hospital-Acquired Illness or Injury  Outcome: Progressing  Goal: Readiness for Transition of Care  Outcome: Progressing     Problem: Infection  Goal: Absence of Infection Signs and Symptoms  Outcome: Progressing     Problem: Fall Injury Risk  Goal: Absence of Fall and Fall-Related Injury  Outcome: Progressing

## 2024-11-28 NOTE — NURSING
Pt refusing labs this morning after educating patient on needing to know blood levels after receiving 1 unit PRBC's. Still refuses.

## 2024-11-28 NOTE — ASSESSMENT & PLAN NOTE
Anemia is likely due to chronic blood loss and menstrual cycles . Most recent hemoglobin and hematocrit are listed below.    Plan  - Monitor serial CBC: Daily  - Transfuse PRBC if patient becomes hemodynamically unstable, symptomatic or H/H drops below 7/21.  - Patient has received 1 units of PRBCs on 11/27 , repeat H&H Pending   - Patient's anemia is currently stable  - no signs of GI losses (constipated and not throwing up)  - suspect due to possible infection, IV fluids and continued menstrual cycle bleeding  - if abdominal pain does not improve and H/H continues to drop, considering GI consultation

## 2024-11-28 NOTE — SUBJECTIVE & OBJECTIVE
Interval History: Pt seen and examined this morning on dave REED. Refused labs overnight. Still on menstrual cycle otherwise no signs of bleeding. Care plan reviewed. Otherwise, doing well and with no further complaints at this time.    Objective:     Vital Signs (Most Recent):  Temp: 98 °F (36.7 °C) (11/28/24 0800)  Pulse: 67 (11/28/24 1102)  Resp: 16 (11/28/24 0830)  BP: (!) 94/52 (11/28/24 0800)  SpO2: 98 % (11/28/24 1102) Vital Signs (24h Range):  Temp:  [98 °F (36.7 °C)-99.1 °F (37.3 °C)] 98 °F (36.7 °C)  Pulse:  [65-75] 67  Resp:  [16-23] 16  SpO2:  [97 %-100 %] 98 %  BP: ()/(52-59) 94/52     Weight: 60 kg (132 lb 4.4 oz)  Body mass index is 20.72 kg/m².    Intake/Output Summary (Last 24 hours) at 11/28/2024 1157  Last data filed at 11/28/2024 1110  Gross per 24 hour   Intake 240 ml   Output 300 ml   Net -60 ml         Physical Exam  Constitutional:       Appearance: Normal appearance.   HENT:      Head: Normocephalic and atraumatic.      Nose: Nose normal.      Mouth/Throat:      Mouth: Mucous membranes are moist.   Eyes:      Extraocular Movements: Extraocular movements intact.      Pupils: Pupils are equal, round, and reactive to light.   Cardiovascular:      Rate and Rhythm: Normal rate and regular rhythm.      Heart sounds: No murmur heard.     No gallop.   Pulmonary:      Effort: Pulmonary effort is normal.      Breath sounds: Normal breath sounds. No wheezing or rales.   Abdominal:      General: Abdomen is flat. Bowel sounds are normal. There is no distension.      Palpations: Abdomen is soft.      Tenderness: There is abdominal tenderness.   Musculoskeletal:         General: No swelling or tenderness. Normal range of motion.      Cervical back: Normal range of motion and neck supple.      Right lower leg: No edema.      Left lower leg: No edema.   Skin:     General: Skin is warm and dry.      Capillary Refill: Capillary refill takes less than 2 seconds.   Neurological:      General: No focal  deficit present.      Mental Status: She is alert. Mental status is at baseline.   Psychiatric:         Mood and Affect: Mood normal.             Significant Labs: All pertinent labs within the past 24 hours have been reviewed.    Significant Imaging: I have reviewed all pertinent imaging results/findings within the past 24 hours.

## 2024-11-28 NOTE — ASSESSMENT & PLAN NOTE
Nausea and vomiting    - afebrile VSS on RA  - WBC 16.28, trending  No significant electrolyte abnormalities  - UA noninfectious but w/ 26 RBC  - CT a/p: Mild asymmetric enlargement of the right ovary of uncertain clinical significance in this patient with reported right lower quadrant pain.  - pelvic US without any significant findings   - MM pain regimen: tylenol 1 g q8h, toradol 15 q6h prn  - prn antiemetics  - continue zosyn   - no clear cause of her pain. Advanced to regular diet and will re evaluate.

## 2024-11-28 NOTE — PLAN OF CARE
Patient had a uneventful shift. Refused morning labs this am, labs drawn late and H&H 7.0 and 22. AAOx4, independent with ADL's, partner at bedside. Patient complains of abdominal pain that radiates to her R side back. IV abx given. Lactulose added to plan of care. Bed low and call light within reach of the patient. No other changed made to plan of care.         Problem: Adult Inpatient Plan of Care  Goal: Plan of Care Review  Outcome: Progressing  Goal: Patient-Specific Goal (Individualized)  Outcome: Progressing  Goal: Absence of Hospital-Acquired Illness or Injury  Outcome: Progressing  Goal: Optimal Comfort and Wellbeing  Outcome: Progressing  Goal: Readiness for Transition of Care  Outcome: Progressing     Problem: Infection  Goal: Absence of Infection Signs and Symptoms  Outcome: Progressing     Problem: Wound  Goal: Optimal Coping  Outcome: Progressing  Goal: Optimal Functional Ability  Outcome: Progressing  Goal: Absence of Infection Signs and Symptoms  Outcome: Progressing  Goal: Improved Oral Intake  Outcome: Progressing  Goal: Optimal Pain Control and Function  Outcome: Progressing  Goal: Skin Health and Integrity  Outcome: Progressing  Goal: Optimal Wound Healing  Outcome: Progressing     Problem: Pain Acute  Goal: Optimal Pain Control and Function  Outcome: Progressing     Problem: Nausea and Vomiting  Goal: Nausea and Vomiting Relief  Outcome: Progressing     Problem: Fall Injury Risk  Goal: Absence of Fall and Fall-Related Injury  Outcome: Progressing     Problem: Skin Injury Risk Increased  Goal: Skin Health and Integrity  Outcome: Progressing

## 2024-11-29 LAB
BACTERIA SPEC AEROBE CULT: ABNORMAL
BASOPHILS # BLD AUTO: 0.04 K/UL (ref 0–0.2)
BASOPHILS NFR BLD: 0.5 % (ref 0–1.9)
DIFFERENTIAL METHOD BLD: ABNORMAL
EOSINOPHIL # BLD AUTO: 0.2 K/UL (ref 0–0.5)
EOSINOPHIL NFR BLD: 2.3 % (ref 0–8)
ERYTHROCYTE [DISTWIDTH] IN BLOOD BY AUTOMATED COUNT: 18.5 % (ref 11.5–14.5)
HCT VFR BLD AUTO: 22.9 % (ref 37–48.5)
HGB BLD-MCNC: 7 G/DL (ref 12–16)
IMM GRANULOCYTES # BLD AUTO: 0.02 K/UL (ref 0–0.04)
IMM GRANULOCYTES NFR BLD AUTO: 0.2 % (ref 0–0.5)
LYMPHOCYTES # BLD AUTO: 2.4 K/UL (ref 1–4.8)
LYMPHOCYTES NFR BLD: 28.3 % (ref 18–48)
MCH RBC QN AUTO: 19.7 PG (ref 27–31)
MCHC RBC AUTO-ENTMCNC: 30.6 G/DL (ref 32–36)
MCV RBC AUTO: 65 FL (ref 82–98)
MONOCYTES # BLD AUTO: 0.6 K/UL (ref 0.3–1)
MONOCYTES NFR BLD: 6.6 % (ref 4–15)
NEUTROPHILS # BLD AUTO: 5.4 K/UL (ref 1.8–7.7)
NEUTROPHILS NFR BLD: 62.1 % (ref 38–73)
NRBC BLD-RTO: 0 /100 WBC
PLATELET # BLD AUTO: 267 K/UL (ref 150–450)
PMV BLD AUTO: 12.1 FL (ref 9.2–12.9)
RBC # BLD AUTO: 3.55 M/UL (ref 4–5.4)
WBC # BLD AUTO: 8.63 K/UL (ref 3.9–12.7)

## 2024-11-29 PROCEDURE — 25000003 PHARM REV CODE 250

## 2024-11-29 PROCEDURE — 20600001 HC STEP DOWN PRIVATE ROOM

## 2024-11-29 PROCEDURE — 63600175 PHARM REV CODE 636 W HCPCS: Performed by: PHYSICIAN ASSISTANT

## 2024-11-29 PROCEDURE — 36415 COLL VENOUS BLD VENIPUNCTURE: CPT | Performed by: STUDENT IN AN ORGANIZED HEALTH CARE EDUCATION/TRAINING PROGRAM

## 2024-11-29 PROCEDURE — A4216 STERILE WATER/SALINE, 10 ML: HCPCS | Performed by: PHYSICIAN ASSISTANT

## 2024-11-29 PROCEDURE — 25000003 PHARM REV CODE 250: Performed by: STUDENT IN AN ORGANIZED HEALTH CARE EDUCATION/TRAINING PROGRAM

## 2024-11-29 PROCEDURE — 25000003 PHARM REV CODE 250: Performed by: PHYSICIAN ASSISTANT

## 2024-11-29 PROCEDURE — 94761 N-INVAS EAR/PLS OXIMETRY MLT: CPT

## 2024-11-29 PROCEDURE — 63600175 PHARM REV CODE 636 W HCPCS

## 2024-11-29 PROCEDURE — 85025 COMPLETE CBC W/AUTO DIFF WBC: CPT | Performed by: STUDENT IN AN ORGANIZED HEALTH CARE EDUCATION/TRAINING PROGRAM

## 2024-11-29 RX ORDER — DICYCLOMINE HYDROCHLORIDE 10 MG/1
10 CAPSULE ORAL 4 TIMES DAILY PRN
Status: DISCONTINUED | OUTPATIENT
Start: 2024-11-29 | End: 2024-12-03 | Stop reason: HOSPADM

## 2024-11-29 RX ORDER — ADHESIVE BANDAGE
30 BANDAGE TOPICAL DAILY PRN
Status: DISCONTINUED | OUTPATIENT
Start: 2024-11-29 | End: 2024-12-03 | Stop reason: HOSPADM

## 2024-11-29 RX ORDER — KETOROLAC TROMETHAMINE 10 MG/1
10 TABLET, FILM COATED ORAL ONCE AS NEEDED
Status: COMPLETED | OUTPATIENT
Start: 2024-11-29 | End: 2024-11-29

## 2024-11-29 RX ADMIN — POLYETHYLENE GLYCOL 3350 17 G: 17 POWDER, FOR SOLUTION ORAL at 09:11

## 2024-11-29 RX ADMIN — SIMETHICONE 80 MG: 80 TABLET, CHEWABLE ORAL at 02:11

## 2024-11-29 RX ADMIN — LACTULOSE 15 G: 20 SOLUTION ORAL at 09:11

## 2024-11-29 RX ADMIN — Medication 10 ML: at 01:11

## 2024-11-29 RX ADMIN — ACETAMINOPHEN 1000 MG: 500 TABLET ORAL at 10:11

## 2024-11-29 RX ADMIN — PIPERACILLIN SODIUM AND TAZOBACTAM SODIUM 4.5 G: 4; .5 INJECTION, POWDER, FOR SOLUTION INTRAVENOUS at 09:11

## 2024-11-29 RX ADMIN — KETOROLAC TROMETHAMINE 15 MG: 30 INJECTION, SOLUTION INTRAMUSCULAR; INTRAVENOUS at 01:11

## 2024-11-29 RX ADMIN — Medication 10 ML: at 06:11

## 2024-11-29 RX ADMIN — PIPERACILLIN SODIUM AND TAZOBACTAM SODIUM 4.5 G: 4; .5 INJECTION, POWDER, FOR SOLUTION INTRAVENOUS at 05:11

## 2024-11-29 RX ADMIN — KETOROLAC TROMETHAMINE 10 MG: 10 TABLET, FILM COATED ORAL at 09:11

## 2024-11-29 RX ADMIN — MAGNESIUM HYDROXIDE 2400 MG: 400 SUSPENSION ORAL at 06:11

## 2024-11-29 RX ADMIN — Medication 10 ML: at 10:11

## 2024-11-29 RX ADMIN — METHADONE HYDROCHLORIDE 110 MG: 10 TABLET ORAL at 09:11

## 2024-11-29 RX ADMIN — ACETAMINOPHEN 1000 MG: 500 TABLET ORAL at 01:11

## 2024-11-29 RX ADMIN — PIPERACILLIN SODIUM AND TAZOBACTAM SODIUM 4.5 G: 4; .5 INJECTION, POWDER, FOR SOLUTION INTRAVENOUS at 01:11

## 2024-11-29 RX ADMIN — ALUMINUM HYDROXIDE, MAGNESIUM HYDROXIDE, AND DIMETHICONE 30 ML: 400; 400; 40 SUSPENSION ORAL at 02:11

## 2024-11-29 RX ADMIN — DICYCLOMINE HYDROCHLORIDE 10 MG: 10 CAPSULE ORAL at 09:11

## 2024-11-29 NOTE — ASSESSMENT & PLAN NOTE
Nausea and vomiting    - afebrile VSS on RA  - WBC 16.28, trending  No significant electrolyte abnormalities  - UA noninfectious but w/ 26 RBC  - CT a/p: Mild asymmetric enlargement of the right ovary of uncertain clinical significance in this patient with reported right lower quadrant pain.  - pelvic US without any significant findings   - MM pain regimen: tylenol 1 g q8h, toradol 15 q6h prn  - prn antiemetics  - continue zosyn   - no clear cause of her pain. Possibly related to constipation as she has a history of constipation. Remains on her cycle. Advanced to regular diet and will re evaluate.   -increase bowel regimen, re assess abdominal pain once patient starts having bowel movements.   -consider GI consult if failure to improve.

## 2024-11-29 NOTE — SUBJECTIVE & OBJECTIVE
Interval History: Pt seen and examined this morning on rounds. REED. Continues to have abdominal pain. Care plan reviewed. Otherwise, doing well and with no further complaints at this time.      Objective:     Vital Signs (Most Recent):  Temp: 98.4 °F (36.9 °C) (11/29/24 0729)  Pulse: 62 (11/29/24 0729)  Resp: 18 (11/29/24 0729)  BP: 96/60 (11/29/24 0729)  SpO2: 100 % (11/29/24 0729) Vital Signs (24h Range):  Temp:  [98 °F (36.7 °C)-98.7 °F (37.1 °C)] 98.4 °F (36.9 °C)  Pulse:  [53-67] 62  Resp:  [12-22] 18  SpO2:  [97 %-100 %] 100 %  BP: ()/(51-60) 96/60     Weight: 60 kg (132 lb 4.4 oz)  Body mass index is 20.72 kg/m².    Intake/Output Summary (Last 24 hours) at 11/29/2024 0746  Last data filed at 11/28/2024 1110  Gross per 24 hour   Intake 240 ml   Output --   Net 240 ml         Physical Exam  Constitutional:       Appearance: Normal appearance.   HENT:      Head: Normocephalic and atraumatic.      Nose: Nose normal.      Mouth/Throat:      Mouth: Mucous membranes are moist.   Eyes:      Extraocular Movements: Extraocular movements intact.      Pupils: Pupils are equal, round, and reactive to light.   Cardiovascular:      Rate and Rhythm: Normal rate and regular rhythm.      Heart sounds: No murmur heard.     No gallop.   Pulmonary:      Effort: Pulmonary effort is normal.      Breath sounds: Normal breath sounds. No wheezing or rales.   Abdominal:      General: Abdomen is flat. Bowel sounds are normal. There is no distension.      Palpations: Abdomen is soft.      Tenderness: There is abdominal tenderness.   Musculoskeletal:         General: No swelling or tenderness. Normal range of motion.      Cervical back: Normal range of motion and neck supple.      Right lower leg: No edema.      Left lower leg: No edema.   Skin:     General: Skin is warm and dry.      Capillary Refill: Capillary refill takes less than 2 seconds.   Neurological:      General: No focal deficit present.      Mental Status: She is  alert. Mental status is at baseline.   Psychiatric:         Mood and Affect: Mood normal.             Significant Labs: All pertinent labs within the past 24 hours have been reviewed.    Significant Imaging: I have reviewed all pertinent imaging results/findings within the past 24 hours.

## 2024-11-29 NOTE — PLAN OF CARE
Shift Note:     Pt Aaox4, on RA, tele- NSR. Pt still experiencing abd discomfort. No BM today- PRN meds for constipation/gas discomfort given. IV ABX given. No acute changes during shift. Significant other at bedside and supportive. Bed low and locked. Call light within reach. No concerns voiced at this time.       Problem: Adult Inpatient Plan of Care  Goal: Plan of Care Review  Outcome: Progressing  Goal: Patient-Specific Goal (Individualized)  Outcome: Progressing  Goal: Absence of Hospital-Acquired Illness or Injury  Outcome: Progressing  Goal: Optimal Comfort and Wellbeing  Outcome: Progressing  Goal: Readiness for Transition of Care  Outcome: Progressing     Problem: Infection  Goal: Absence of Infection Signs and Symptoms  Outcome: Progressing     Problem: Wound  Goal: Optimal Coping  Outcome: Progressing  Goal: Optimal Functional Ability  Outcome: Progressing  Goal: Absence of Infection Signs and Symptoms  Outcome: Progressing  Goal: Improved Oral Intake  Outcome: Progressing  Goal: Optimal Pain Control and Function  Outcome: Progressing  Goal: Skin Health and Integrity  Outcome: Progressing  Goal: Optimal Wound Healing  Outcome: Progressing     Problem: Pain Acute  Goal: Optimal Pain Control and Function  Outcome: Progressing     Problem: Nausea and Vomiting  Goal: Nausea and Vomiting Relief  Outcome: Progressing     Problem: Fall Injury Risk  Goal: Absence of Fall and Fall-Related Injury  Outcome: Progressing     Problem: Skin Injury Risk Increased  Goal: Skin Health and Integrity  Outcome: Progressing

## 2024-11-29 NOTE — PLAN OF CARE
Discharge Plan A and Plan B have been determined by review of patient's clinical status, future medical and therapeutic needs, and coverage/benefits for post-acute care in coordination with multidisciplinary team members.      11/29/24 1458   Post-Acute Status   Coverage : MEDICAID - AETNA [facility] -   Discharge Plan   Discharge Plan A Home with family   Discharge Plan B Home     CM received a secure chat from sanchez Castellon , utilization management and requested to speak with the patient to confirm is AETNA is a primary insurance.    1: 45 pm  CM met with patient and boyfriend Jhonny DC , who is an approved representative to speak on behalf of the patient. CM provided the contact number for MEDICAID - [employer]ET[employer] [facility] - and Jhonny will call to verify that AETNA has been renewed and  [employer] is no longer active.    2:51 pm  CM received a phone call form Jhonny and updated CM that   AETNA was renewed on 11/27/24 per Jhonny . Jhonny and patient were able to  call [employer] and confirmed that [employer]ETNA is the primary  payor and this information will be updated today. The reference # given #[id number]. CM sent an updated secure chat to  Katia Castellon.       Savanna Perez RN  Case Management  [facility]  [phone]

## 2024-11-29 NOTE — ASSESSMENT & PLAN NOTE
Anemia is likely due to chronic blood loss and menstrual cycles . Most recent hemoglobin and hematocrit are listed below.    Plan  - Monitor serial CBC: Daily  - Transfuse PRBC if patient becomes hemodynamically unstable, symptomatic or H/H drops below 7/21.  - Patient has received 1 units of PRBCs on 11/27 , remained stable at 7   - Patient's anemia is currently stable  - no signs of GI losses (constipated and not throwing up)  - suspect due to possible infection, IV fluids and continued menstrual cycle bleeding  - if abdominal pain does not improve and H/H continues to drop, considering GI consultation

## 2024-11-30 LAB
ANION GAP SERPL CALC-SCNC: 7 MMOL/L (ref 8–16)
BASOPHILS # BLD AUTO: 0.02 K/UL (ref 0–0.2)
BASOPHILS NFR BLD: 0.3 % (ref 0–1.9)
BLD PROD TYP BPU: NORMAL
BLOOD UNIT EXPIRATION DATE: NORMAL
BLOOD UNIT TYPE CODE: 600
BLOOD UNIT TYPE: NORMAL
BUN SERPL-MCNC: 13 MG/DL (ref 6–20)
CALCIUM SERPL-MCNC: 8 MG/DL (ref 8.7–10.5)
CHLORIDE SERPL-SCNC: 107 MMOL/L (ref 95–110)
CO2 SERPL-SCNC: 26 MMOL/L (ref 23–29)
CODING SYSTEM: NORMAL
CREAT SERPL-MCNC: 0.6 MG/DL (ref 0.5–1.4)
CROSSMATCH INTERPRETATION: NORMAL
DIFFERENTIAL METHOD BLD: ABNORMAL
DISPENSE STATUS: NORMAL
EOSINOPHIL # BLD AUTO: 0.2 K/UL (ref 0–0.5)
EOSINOPHIL NFR BLD: 3.2 % (ref 0–8)
ERYTHROCYTE [DISTWIDTH] IN BLOOD BY AUTOMATED COUNT: 18.6 % (ref 11.5–14.5)
EST. GFR  (NO RACE VARIABLE): >60 ML/MIN/1.73 M^2
GLUCOSE SERPL-MCNC: 67 MG/DL (ref 70–110)
HCT VFR BLD AUTO: 22 % (ref 37–48.5)
HGB BLD-MCNC: 6.5 G/DL (ref 12–16)
IMM GRANULOCYTES # BLD AUTO: 0.02 K/UL (ref 0–0.04)
IMM GRANULOCYTES NFR BLD AUTO: 0.3 % (ref 0–0.5)
LYMPHOCYTES # BLD AUTO: 2.5 K/UL (ref 1–4.8)
LYMPHOCYTES NFR BLD: 37.9 % (ref 18–48)
MCH RBC QN AUTO: 19.3 PG (ref 27–31)
MCHC RBC AUTO-ENTMCNC: 29.5 G/DL (ref 32–36)
MCV RBC AUTO: 65 FL (ref 82–98)
MONOCYTES # BLD AUTO: 0.5 K/UL (ref 0.3–1)
MONOCYTES NFR BLD: 6.9 % (ref 4–15)
NEUTROPHILS # BLD AUTO: 3.3 K/UL (ref 1.8–7.7)
NEUTROPHILS NFR BLD: 51.4 % (ref 38–73)
NRBC BLD-RTO: 0 /100 WBC
PLATELET # BLD AUTO: 276 K/UL (ref 150–450)
PMV BLD AUTO: 11.7 FL (ref 9.2–12.9)
POTASSIUM SERPL-SCNC: 3.8 MMOL/L (ref 3.5–5.1)
RBC # BLD AUTO: 3.37 M/UL (ref 4–5.4)
SODIUM SERPL-SCNC: 140 MMOL/L (ref 136–145)
TRANS ERYTHROCYTES VOL PATIENT: NORMAL ML
WBC # BLD AUTO: 6.49 K/UL (ref 3.9–12.7)

## 2024-11-30 PROCEDURE — 80048 BASIC METABOLIC PNL TOTAL CA: CPT | Performed by: STUDENT IN AN ORGANIZED HEALTH CARE EDUCATION/TRAINING PROGRAM

## 2024-11-30 PROCEDURE — 63600175 PHARM REV CODE 636 W HCPCS: Performed by: STUDENT IN AN ORGANIZED HEALTH CARE EDUCATION/TRAINING PROGRAM

## 2024-11-30 PROCEDURE — P9021 RED BLOOD CELLS UNIT: HCPCS | Performed by: STUDENT IN AN ORGANIZED HEALTH CARE EDUCATION/TRAINING PROGRAM

## 2024-11-30 PROCEDURE — 86920 COMPATIBILITY TEST SPIN: CPT | Performed by: STUDENT IN AN ORGANIZED HEALTH CARE EDUCATION/TRAINING PROGRAM

## 2024-11-30 PROCEDURE — 25000003 PHARM REV CODE 250: Performed by: STUDENT IN AN ORGANIZED HEALTH CARE EDUCATION/TRAINING PROGRAM

## 2024-11-30 PROCEDURE — 25000003 PHARM REV CODE 250: Performed by: PHYSICIAN ASSISTANT

## 2024-11-30 PROCEDURE — A4216 STERILE WATER/SALINE, 10 ML: HCPCS | Performed by: PHYSICIAN ASSISTANT

## 2024-11-30 PROCEDURE — 99223 1ST HOSP IP/OBS HIGH 75: CPT | Mod: ,,, | Performed by: STUDENT IN AN ORGANIZED HEALTH CARE EDUCATION/TRAINING PROGRAM

## 2024-11-30 PROCEDURE — 30233N1 TRANSFUSION OF NONAUTOLOGOUS RED BLOOD CELLS INTO PERIPHERAL VEIN, PERCUTANEOUS APPROACH: ICD-10-PCS | Performed by: STUDENT IN AN ORGANIZED HEALTH CARE EDUCATION/TRAINING PROGRAM

## 2024-11-30 PROCEDURE — 63600175 PHARM REV CODE 636 W HCPCS

## 2024-11-30 PROCEDURE — 36430 TRANSFUSION BLD/BLD COMPNT: CPT

## 2024-11-30 PROCEDURE — 20600001 HC STEP DOWN PRIVATE ROOM

## 2024-11-30 PROCEDURE — 25000003 PHARM REV CODE 250

## 2024-11-30 PROCEDURE — 36415 COLL VENOUS BLD VENIPUNCTURE: CPT | Performed by: STUDENT IN AN ORGANIZED HEALTH CARE EDUCATION/TRAINING PROGRAM

## 2024-11-30 PROCEDURE — 85025 COMPLETE CBC W/AUTO DIFF WBC: CPT | Performed by: STUDENT IN AN ORGANIZED HEALTH CARE EDUCATION/TRAINING PROGRAM

## 2024-11-30 PROCEDURE — 87591 N.GONORRHOEAE DNA AMP PROB: CPT | Performed by: STUDENT IN AN ORGANIZED HEALTH CARE EDUCATION/TRAINING PROGRAM

## 2024-11-30 RX ORDER — HYDROCODONE BITARTRATE AND ACETAMINOPHEN 500; 5 MG/1; MG/1
TABLET ORAL
Status: DISCONTINUED | OUTPATIENT
Start: 2024-11-30 | End: 2024-12-03 | Stop reason: HOSPADM

## 2024-11-30 RX ORDER — AMOXICILLIN AND CLAVULANATE POTASSIUM 875; 125 MG/1; MG/1
1 TABLET, FILM COATED ORAL EVERY 12 HOURS
Status: COMPLETED | OUTPATIENT
Start: 2024-11-30 | End: 2024-12-01

## 2024-11-30 RX ORDER — PANTOPRAZOLE SODIUM 40 MG/10ML
40 INJECTION, POWDER, LYOPHILIZED, FOR SOLUTION INTRAVENOUS EVERY 12 HOURS
Status: DISCONTINUED | OUTPATIENT
Start: 2024-11-30 | End: 2024-12-02

## 2024-11-30 RX ADMIN — AMOXICILLIN AND CLAVULANATE POTASSIUM 1 TABLET: 875; 125 TABLET, FILM COATED ORAL at 09:11

## 2024-11-30 RX ADMIN — ALUMINUM HYDROXIDE, MAGNESIUM HYDROXIDE, AND DIMETHICONE 30 ML: 400; 400; 40 SUSPENSION ORAL at 09:11

## 2024-11-30 RX ADMIN — MAGNESIUM HYDROXIDE 2400 MG: 400 SUSPENSION ORAL at 02:11

## 2024-11-30 RX ADMIN — ACETAMINOPHEN 1000 MG: 500 TABLET ORAL at 02:11

## 2024-11-30 RX ADMIN — METHADONE HYDROCHLORIDE 110 MG: 10 TABLET ORAL at 09:11

## 2024-11-30 RX ADMIN — Medication 10 ML: at 02:11

## 2024-11-30 RX ADMIN — ALUMINUM HYDROXIDE, MAGNESIUM HYDROXIDE, AND DIMETHICONE 30 ML: 400; 400; 40 SUSPENSION ORAL at 06:11

## 2024-11-30 RX ADMIN — Medication 10 ML: at 10:11

## 2024-11-30 RX ADMIN — PANTOPRAZOLE SODIUM 40 MG: 40 INJECTION, POWDER, LYOPHILIZED, FOR SOLUTION INTRAVENOUS at 12:11

## 2024-11-30 RX ADMIN — PIPERACILLIN SODIUM AND TAZOBACTAM SODIUM 4.5 G: 4; .5 INJECTION, POWDER, FOR SOLUTION INTRAVENOUS at 01:11

## 2024-11-30 RX ADMIN — ALUMINUM HYDROXIDE, MAGNESIUM HYDROXIDE, AND DIMETHICONE 30 ML: 400; 400; 40 SUSPENSION ORAL at 12:11

## 2024-11-30 RX ADMIN — PANTOPRAZOLE SODIUM 40 MG: 40 INJECTION, POWDER, LYOPHILIZED, FOR SOLUTION INTRAVENOUS at 09:11

## 2024-11-30 RX ADMIN — POLYETHYLENE GLYCOL 3350 17 G: 17 POWDER, FOR SOLUTION ORAL at 09:11

## 2024-11-30 RX ADMIN — Medication 10 ML: at 06:11

## 2024-11-30 RX ADMIN — LACTULOSE 15 G: 20 SOLUTION ORAL at 09:11

## 2024-11-30 RX ADMIN — ACETAMINOPHEN 1000 MG: 500 TABLET ORAL at 09:11

## 2024-11-30 NOTE — CONSULTS
"[facility]  Gastroenterology  Consult Note    Patient Name: Kavya Bundy  MRN: [id number]  Admission Date: 11/26/2024  Hospital Length of Stay: 3 days  Code Status: Full Code   Attending Provider: Stella Vila MD   Consulting Provider: Doc Lawrence MD  Primary Care Physician: Otilia, Primary Doctor  Principal Problem:Intractable lower abdominal pain    Inpatient consult to Gastroenterology  Consult performed by: Doc Lawrence MD  Consult ordered by: Stella Vila MD        Subjective:     HPI: Kavya Bundy is a 34 y.o. female with history of IVDU in remission (on methadone), recurrent leg abscesses who presents with LUQ abdominal pain, and nausea, and weight loss over the past few months. Gi consulted for further workup and management of this and for workup of acute on chronic anemia. Pt reports having these symptoms and inability to gain the weight back. Denies dysphagia, odynophagia, vomiting, diarrhea, constipation, hematemesis, hematochezia, melena. She does endorse taking ibuprofen "like it's candy." Hgb 6.5 on arrival with baseline around 11. No overt GI bleeding seen.      Objective:     Vitals:    11/30/24 1625   BP: 108/65   Pulse: (!) 59   Resp: 15   Temp: 98.4 °F (36.9 °C)         Constitutional: thin,  not in acute distress, and well developed  HENT: Head: Normal, normocephalic, atraumatic.  Eyes: conjunctiva clear and sclera nonicteric  GI: soft, non-tender, without masses or organomegaly  Skin: normal color  Neurological: alert, oriented x3  Psychiatric: mood and affect are within normal limits, pt is a good historian; no memory problems were noted    Significant Labs:  Reviewed pertinent laboratory tests    Significant Imaging:  Reviewed       Assessment/Plan:     Kavya Bundy is a 34 y.o. female with history of IVDU in remission (on methadone), recurrent leg abscesses who presents with LUQ abdominal pain, and nausea, and weight loss over the " "past few months. Gi consulted for further workup and management of this and of acute on chronic anemia. Pt reports having these symptoms and inability to gain the weight back. Denies dysphagia, odynophagia, vomiting, diarrhea, constipation, hematemesis, hematochezia, melena. She does endorse taking ibuprofen "like it's candy." Pt likely with NSAID induced ulcer which would explain her symptoms and anemia.    Problem List:  Acute on chronic anemia  Nausea  Weight loss  LUQ abdominal pain    Recommendations:  - Plan for EGD on Monday  - Trend Hgb [id number]. Transfuse for Hgb <7, unless otherwise indicated  - Maintain IV access with 2 large bore Ivs  - Intravascular resuscitation/support with IVFs   - Regular diet now and NPO midnight Sunday night  - Hold all NSAIDs and anticoagulants, unless contraindicated  - Bolus IV pantoprazole 80mg followed by 40mg BID  - Please correct any coagulopathy with platelets and FFP for goal of platelets >50K and INR <2.0  - Please notify GI team if there is significant change in patient's clinical status    Thank you for involving us in the care of Kavya Bundy. Please call with any additional questions, concerns or changes in the patient's clinical status. We will continue to follow.     Discussed with Dr. Muse.    Doc Lawrence MD  Gastroenterology Fellow PGY-IV  [facility]  "

## 2024-11-30 NOTE — ASSESSMENT & PLAN NOTE
Nausea and vomiting    - afebrile VSS on RA  - WBC 16.28, trending  No significant electrolyte abnormalities  - UA noninfectious but w/ 26 RBC  - CT a/p: Mild asymmetric enlargement of the right ovary of uncertain clinical significance in this patient with reported right lower quadrant pain.  - pelvic US without any significant findings   - MM pain regimen: tylenol 1 g q8h, hold toradol   - prn antiemetics  - stop Zosyn   - no clear cause of her pain. Possibly related to constipation as she has a history of constipation. Completing her cycle. Advanced to regular diet and will re evaluate.   -increase bowel regimen, KUB ordered   -concern for difficulty swallowing and weight loss with H&H drop and abdominal pain   -GI consulted given continued abdominal pain and H&H drop   -start IV PPI, scope Monday 12/2/24

## 2024-11-30 NOTE — SUBJECTIVE & OBJECTIVE
Interval History: Pt seen and examined this morning on rounds. REED. Continues to have abdominal pain and no bowel movements. H&H drop overnight. Eating some. Care plan reviewed. Otherwise, doing well and with no further complaints at this time.      Review of Systems  Objective:     Vital Signs (Most Recent):  Temp: 98.4 °F (36.9 °C) (11/30/24 1103)  Pulse: 60 (11/30/24 1103)  Resp: 16 (11/30/24 1103)  BP: (!) 93/55 (11/30/24 1103)  SpO2: 95 % (11/30/24 1103) Vital Signs (24h Range):  Temp:  [97.7 °F (36.5 °C)-98.5 °F (36.9 °C)] 98.4 °F (36.9 °C)  Pulse:  [56-62] 60  Resp:  [13-20] 16  SpO2:  [95 %-99 %] 95 %  BP: (93-99)/(52-63) 93/55     Weight: 60 kg (132 lb 4.4 oz)  Body mass index is 20.72 kg/m².    Intake/Output Summary (Last 24 hours) at 11/30/2024 1305  Last data filed at 11/29/2024 1646  Gross per 24 hour   Intake 342 ml   Output --   Net 342 ml         Physical Exam  Constitutional:       Appearance: Normal appearance.   HENT:      Head: Normocephalic and atraumatic.      Nose: Nose normal.      Mouth/Throat:      Mouth: Mucous membranes are moist.   Eyes:      Extraocular Movements: Extraocular movements intact.      Pupils: Pupils are equal, round, and reactive to light.   Cardiovascular:      Rate and Rhythm: Normal rate and regular rhythm.      Heart sounds: No murmur heard.     No gallop.   Pulmonary:      Effort: Pulmonary effort is normal.      Breath sounds: Normal breath sounds. No wheezing or rales.   Abdominal:      General: Abdomen is flat. Bowel sounds are normal. There is no distension.      Palpations: Abdomen is soft.      Tenderness: There is abdominal tenderness.   Musculoskeletal:         General: No swelling or tenderness. Normal range of motion.      Cervical back: Normal range of motion and neck supple.      Right lower leg: No edema.      Left lower leg: No edema.   Skin:     General: Skin is warm and dry.      Capillary Refill: Capillary refill takes less than 2 seconds.    Neurological:      General: No focal deficit present.      Mental Status: She is alert. Mental status is at baseline.   Psychiatric:         Mood and Affect: Mood normal.             Significant Labs: All pertinent labs within the past 24 hours have been reviewed.    Significant Imaging: I have reviewed all pertinent imaging results/findings within the past 24 hours.

## 2024-11-30 NOTE — ASSESSMENT & PLAN NOTE
Anemia is likely due to chronic blood loss and menstrual cycles . Most recent hemoglobin and hematocrit are listed below.    Plan  - Monitor serial CBC: Daily  - Transfuse PRBC if patient becomes hemodynamically unstable, symptomatic or H/H drops below 7/21.  - Patient has received 1 units of PRBCs on 11/27 , remained stable at 7 but then dropped again 11/30 requiring another unit   - Patient's anemia is currently stable  - no signs of GI losses (constipated and not throwing up)  -GI consulted given failure to improve, see plan above

## 2024-11-30 NOTE — PLAN OF CARE
Pt slept well this shift. A&Ox 4. VSS. On RA. NSR/nathaniel on tele. IV abx. Up independently. One time dose toradol for lower abd pain. Bentyl added to PRN. Safety precautions in place. Call light and personal items in reach. No further concerns noted at this time. No acute distress noted this shift. POC ongoing.     Problem: Adult Inpatient Plan of Care  Goal: Plan of Care Review  Outcome: Progressing  Goal: Patient-Specific Goal (Individualized)  Outcome: Progressing  Goal: Absence of Hospital-Acquired Illness or Injury  Outcome: Progressing  Goal: Optimal Comfort and Wellbeing  Outcome: Progressing  Goal: Readiness for Transition of Care  Outcome: Progressing     Problem: Infection  Goal: Absence of Infection Signs and Symptoms  Outcome: Progressing     Problem: Wound  Goal: Optimal Coping  Outcome: Progressing  Goal: Optimal Functional Ability  Outcome: Progressing  Goal: Absence of Infection Signs and Symptoms  Outcome: Progressing  Goal: Improved Oral Intake  Outcome: Progressing  Goal: Optimal Pain Control and Function  Outcome: Progressing  Goal: Skin Health and Integrity  Outcome: Progressing  Goal: Optimal Wound Healing  Outcome: Progressing     Problem: Nausea and Vomiting  Goal: Nausea and Vomiting Relief  Outcome: Progressing     Problem: Fall Injury Risk  Goal: Absence of Fall and Fall-Related Injury  Outcome: Progressing     Problem: Skin Injury Risk Increased  Goal: Skin Health and Integrity  Outcome: Progressing

## 2024-11-30 NOTE — PROGRESS NOTES
[facility] - Stepdown Flex ([place] [id number])  [facility] Medicine  Progress Note    Patient Name: Kavya Bundy  MRN: [id number]  Patient Class: IP- Inpatient   Admission Date: 11/26/2024  Length of Stay: 3 days  Attending Physician: Stella Vila MD  Primary Care Provider: Otilia, Primary Doctor        Subjective:     Principal Problem:Intractable lower abdominal pain        HPI:  Ms. Sweet has a history of IVDU in remission and long term use of methadone at a clinic, recurrent leg abscesses, last seen in June 2024 for an abscess, incision and drainage performed, cultures grew strep intermedius, prescribed bactrim, and now presents for abdominal pain and tenderness along with vomiting. The patient reports abdominal pain started yesterday evening approx 5 hours prior to arrival. She had sudden sharp, stabbing pain in her right lower quadrant. Shortly after the pain began, she experienced intractable vomiting. Her spouse called EMS. She endorses headache and dizziness but no chest pain or palpitations. Two days prior, the patient reported having a fever. She remembered that one of her temperatures were greater than 100. Additionally she reports an abscess on the right lower leg that is purulent and painful. Her spouse says she has recurrent abscesses often and does not complete the antibiotic regimen prescribed. She is currently on her menses. She denied dysuria and hematuria. Her spouse says she was concerned for a UTI due to dysuria two weeks prior to tonight's presentation. Last bowel movement was 11/24.     In ED, Pt afebrile w/ soft BP 94/51, other VSS on RA. WBC 16.28. Hgb 7.9. No significant electrolyte abnormalities. UA noninfectious but w/ 26 RBC. CT a/p: Mild asymmetric enlargement of the right ovary of uncertain clinical significance in this patient with reported right lower quadrant pain. Given IV morphine, IV antiemetics, zosyn and vancomycin and IVF.     Overview/Hospital Course:  Patient admitted for  abdominal pain, no clear cause or source identified. Continue with pain management plan. She was anemic, possibly from menstrual cycles as she has not been having melena or bloody emesis.     Interval History: Pt seen and examined this morning on rounds. REED. Continues to have abdominal pain and no bowel movements. H&H drop overnight. Eating some. Care plan reviewed. Otherwise, doing well and with no further complaints at this time.      Review of Systems  Objective:     Vital Signs (Most Recent):  Temp: 98.4 °F (36.9 °C) (11/30/24 1103)  Pulse: 60 (11/30/24 1103)  Resp: 16 (11/30/24 1103)  BP: (!) 93/55 (11/30/24 1103)  SpO2: 95 % (11/30/24 1103) Vital Signs (24h Range):  Temp:  [97.7 °F (36.5 °C)-98.5 °F (36.9 °C)] 98.4 °F (36.9 °C)  Pulse:  [56-62] 60  Resp:  [13-20] 16  SpO2:  [95 %-99 %] 95 %  BP: (93-99)/(52-63) 93/55     Weight: 60 kg (132 lb 4.4 oz)  Body mass index is 20.72 kg/m².    Intake/Output Summary (Last 24 hours) at 11/30/2024 1305  Last data filed at 11/29/2024 1646  Gross per 24 hour   Intake 342 ml   Output --   Net 342 ml         Physical Exam  Constitutional:       Appearance: Normal appearance.   HENT:      Head: Normocephalic and atraumatic.      Nose: Nose normal.      Mouth/Throat:      Mouth: Mucous membranes are moist.   Eyes:      Extraocular Movements: Extraocular movements intact.      Pupils: Pupils are equal, round, and reactive to light.   Cardiovascular:      Rate and Rhythm: Normal rate and regular rhythm.      Heart sounds: No murmur heard.     No gallop.   Pulmonary:      Effort: Pulmonary effort is normal.      Breath sounds: Normal breath sounds. No wheezing or rales.   Abdominal:      General: Abdomen is flat. Bowel sounds are normal. There is no distension.      Palpations: Abdomen is soft.      Tenderness: There is abdominal tenderness.   Musculoskeletal:         General: No swelling or tenderness. Normal range of motion.      Cervical back: Normal range of motion and  neck supple.      Right lower leg: No edema.      Left lower leg: No edema.   Skin:     General: Skin is warm and dry.      Capillary Refill: Capillary refill takes less than 2 seconds.   Neurological:      General: No focal deficit present.      Mental Status: She is alert. Mental status is at baseline.   Psychiatric:         Mood and Affect: Mood normal.             Significant Labs: All pertinent labs within the past 24 hours have been reviewed.    Significant Imaging: I have reviewed all pertinent imaging results/findings within the past 24 hours.    Assessment/Plan:      * Intractable lower abdominal pain  Nausea and vomiting    - afebrile VSS on RA  - WBC 16.28, trending  No significant electrolyte abnormalities  - UA noninfectious but w/ 26 RBC  - CT a/p: Mild asymmetric enlargement of the right ovary of uncertain clinical significance in this patient with reported right lower quadrant pain.  - pelvic US without any significant findings   - MM pain regimen: tylenol 1 g q8h, hold toradol   - prn antiemetics  - stop Zosyn   - no clear cause of her pain. Possibly related to constipation as she has a history of constipation. Completing her cycle. Advanced to regular diet and will re evaluate.   -increase bowel regimen, KUB ordered   -concern for difficulty swallowing and weight loss with H&H drop and abdominal pain   -GI consulted given continued abdominal pain and H&H drop   -start IV PPI, scope Monday 12/2/24     Nausea and vomiting  No longer present, has not happened since admission      Acute blood loss anemia  Anemia is likely due to chronic blood loss and menstrual cycles . Most recent hemoglobin and hematocrit are listed below.    Plan  - Monitor serial CBC: Daily  - Transfuse PRBC if patient becomes hemodynamically unstable, symptomatic or H/H drops below 7/21.  - Patient has received 1 units of PRBCs on 11/27 , remained stable at 7 but then dropped again 11/30 requiring another unit   - Patient's anemia  is currently stable  - no signs of GI losses (constipated and not throwing up)  -GI consulted given failure to improve, see plan above         Hypotension  - BP 90/50s  -stable, will continue to monitor       Leg abscess  - WBC 16, improving with treatment   - stop Zosyn and start Augmentin   - wound care consulted    Tobacco abuse  Dangers of cigarette smoking were reviewed with patient in detail for 10 minutes and patient was encouraged to quit. Nicotine replacement options were discussed.    Polysubstance abuse  - denies any recent drug use.   - methadone 110 mg qd, follows with methadone clinic   - consider addiction psych consult if not improving      VTE Risk Mitigation (From admission, onward)           Ordered     IP VTE LOW RISK PATIENT  Once         11/26/24 0931     Place CHERYL hose  Until discontinued         11/26/24 0931     Place sequential compression device  Until discontinued         11/26/24 0931     Place sequential compression device  Until discontinued         11/26/24 0931                    Discharge Planning   ABHINAV: 12/2/2024     Code Status: Full Code   Is the patient medically ready for discharge?:     Reason for patient still in hospital (select all that apply): Treatment, Imaging, and Consult recommendations  Discharge Plan A: Home with family                  Stella Vila MD  Department of Hospital Medicine   [facility] - Stepdown Flex ([place]-14)

## 2024-11-30 NOTE — PLAN OF CARE
Shift Note:     Pt AAOx4, on RA, tele- NSR. Still no BM. X ray of ABD completed. EGD scheduled for Monday. 1 U RBC given today. Ambulatory to restroom independently. Significant other at bedside and attentive. Pt grandmother called and updated on care per request of pt. Bed low and locked. Call light in reach. No concerns voiced at this time.       Problem: Adult Inpatient Plan of Care  Goal: Plan of Care Review  Outcome: Progressing  Goal: Patient-Specific Goal (Individualized)  Outcome: Progressing  Goal: Absence of Hospital-Acquired Illness or Injury  Outcome: Progressing  Goal: Optimal Comfort and Wellbeing  Outcome: Progressing  Goal: Readiness for Transition of Care  Outcome: Progressing     Problem: Infection  Goal: Absence of Infection Signs and Symptoms  Outcome: Progressing     Problem: Wound  Goal: Optimal Coping  Outcome: Progressing  Goal: Optimal Functional Ability  Outcome: Progressing  Goal: Absence of Infection Signs and Symptoms  Outcome: Progressing  Goal: Improved Oral Intake  Outcome: Progressing  Goal: Optimal Pain Control and Function  Outcome: Progressing  Goal: Skin Health and Integrity  Outcome: Progressing  Goal: Optimal Wound Healing  Outcome: Progressing     Problem: Pain Acute  Goal: Optimal Pain Control and Function  Outcome: Progressing     Problem: Nausea and Vomiting  Goal: Nausea and Vomiting Relief  Outcome: Progressing     Problem: Fall Injury Risk  Goal: Absence of Fall and Fall-Related Injury  Outcome: Progressing     Problem: Skin Injury Risk Increased  Goal: Skin Health and Integrity  Outcome: Progressing

## 2024-12-01 LAB
BACTERIA BLD CULT: NORMAL
BACTERIA BLD CULT: NORMAL
ERYTHROCYTE [DISTWIDTH] IN BLOOD BY AUTOMATED COUNT: 22 % (ref 11.5–14.5)
HCT VFR BLD AUTO: 25.7 % (ref 37–48.5)
HGB BLD-MCNC: 7.8 G/DL (ref 12–16)
MCH RBC QN AUTO: 20.6 PG (ref 27–31)
MCHC RBC AUTO-ENTMCNC: 30.4 G/DL (ref 32–36)
MCV RBC AUTO: 68 FL (ref 82–98)
PLATELET # BLD AUTO: 288 K/UL (ref 150–450)
PMV BLD AUTO: ABNORMAL FL (ref 9.2–12.9)
RBC # BLD AUTO: 3.78 M/UL (ref 4–5.4)
WBC # BLD AUTO: 7.1 K/UL (ref 3.9–12.7)

## 2024-12-01 PROCEDURE — 25000003 PHARM REV CODE 250: Performed by: STUDENT IN AN ORGANIZED HEALTH CARE EDUCATION/TRAINING PROGRAM

## 2024-12-01 PROCEDURE — 85027 COMPLETE CBC AUTOMATED: CPT | Performed by: STUDENT IN AN ORGANIZED HEALTH CARE EDUCATION/TRAINING PROGRAM

## 2024-12-01 PROCEDURE — 25000003 PHARM REV CODE 250: Performed by: PHYSICIAN ASSISTANT

## 2024-12-01 PROCEDURE — A4216 STERILE WATER/SALINE, 10 ML: HCPCS | Performed by: PHYSICIAN ASSISTANT

## 2024-12-01 PROCEDURE — 36415 COLL VENOUS BLD VENIPUNCTURE: CPT | Performed by: STUDENT IN AN ORGANIZED HEALTH CARE EDUCATION/TRAINING PROGRAM

## 2024-12-01 PROCEDURE — 63600175 PHARM REV CODE 636 W HCPCS: Performed by: STUDENT IN AN ORGANIZED HEALTH CARE EDUCATION/TRAINING PROGRAM

## 2024-12-01 PROCEDURE — A4216 STERILE WATER/SALINE, 10 ML: HCPCS | Performed by: EMERGENCY MEDICINE

## 2024-12-01 PROCEDURE — 25000003 PHARM REV CODE 250

## 2024-12-01 PROCEDURE — 25000003 PHARM REV CODE 250: Performed by: EMERGENCY MEDICINE

## 2024-12-01 PROCEDURE — 20600001 HC STEP DOWN PRIVATE ROOM

## 2024-12-01 RX ORDER — MORPHINE SULFATE 2 MG/ML
2 INJECTION, SOLUTION INTRAMUSCULAR; INTRAVENOUS ONCE
Status: COMPLETED | OUTPATIENT
Start: 2024-12-01 | End: 2024-12-01

## 2024-12-01 RX ORDER — GLYCERIN 1 G/1
1 SUPPOSITORY RECTAL ONCE
Status: COMPLETED | OUTPATIENT
Start: 2024-12-01 | End: 2024-12-01

## 2024-12-01 RX ADMIN — ALUMINUM HYDROXIDE, MAGNESIUM HYDROXIDE, AND DIMETHICONE 30 ML: 400; 400; 40 SUSPENSION ORAL at 01:12

## 2024-12-01 RX ADMIN — Medication 10 ML: at 01:12

## 2024-12-01 RX ADMIN — PANTOPRAZOLE SODIUM 40 MG: 40 INJECTION, POWDER, LYOPHILIZED, FOR SOLUTION INTRAVENOUS at 09:12

## 2024-12-01 RX ADMIN — Medication 10 ML: at 06:12

## 2024-12-01 RX ADMIN — Medication 10 ML: at 09:12

## 2024-12-01 RX ADMIN — GLYCERIN 1 SUPPOSITORY: 2 SUPPOSITORY RECTAL at 03:12

## 2024-12-01 RX ADMIN — POLYETHYLENE GLYCOL 3350 17 G: 17 POWDER, FOR SOLUTION ORAL at 08:12

## 2024-12-01 RX ADMIN — PANTOPRAZOLE SODIUM 40 MG: 40 INJECTION, POWDER, LYOPHILIZED, FOR SOLUTION INTRAVENOUS at 08:12

## 2024-12-01 RX ADMIN — MORPHINE SULFATE 2 MG: 2 INJECTION, SOLUTION INTRAMUSCULAR; INTRAVENOUS at 01:12

## 2024-12-01 RX ADMIN — ACETAMINOPHEN 1000 MG: 500 TABLET ORAL at 06:12

## 2024-12-01 RX ADMIN — AMOXICILLIN AND CLAVULANATE POTASSIUM 1 TABLET: 875; 125 TABLET, FILM COATED ORAL at 08:12

## 2024-12-01 RX ADMIN — METHADONE HYDROCHLORIDE 110 MG: 10 TABLET ORAL at 08:12

## 2024-12-01 RX ADMIN — DICYCLOMINE HYDROCHLORIDE 10 MG: 10 CAPSULE ORAL at 09:12

## 2024-12-01 RX ADMIN — ACETAMINOPHEN 1000 MG: 500 TABLET ORAL at 09:12

## 2024-12-01 RX ADMIN — DICYCLOMINE HYDROCHLORIDE 10 MG: 10 CAPSULE ORAL at 01:12

## 2024-12-01 RX ADMIN — LACTULOSE 15 G: 20 SOLUTION ORAL at 08:12

## 2024-12-01 RX ADMIN — LACTULOSE 15 G: 20 SOLUTION ORAL at 09:12

## 2024-12-01 NOTE — PLAN OF CARE
Problem: Adult Inpatient Plan of Care  Goal: Plan of Care Review  Outcome: Progressing  Goal: Patient-Specific Goal (Individualized)  Outcome: Progressing  Goal: Absence of Hospital-Acquired Illness or Injury  Outcome: Progressing  Goal: Optimal Comfort and Wellbeing  Outcome: Progressing  Goal: Readiness for Transition of Care  Outcome: Progressing     Problem: Infection  Goal: Absence of Infection Signs and Symptoms  Outcome: Progressing     Problem: Wound  Goal: Optimal Coping  Outcome: Progressing  Goal: Optimal Functional Ability  Outcome: Progressing  Goal: Absence of Infection Signs and Symptoms  Outcome: Progressing  Goal: Improved Oral Intake  Outcome: Progressing  Goal: Optimal Pain Control and Function  Outcome: Progressing  Goal: Skin Health and Integrity  Outcome: Progressing  Goal: Optimal Wound Healing  Outcome: Progressing     Problem: Pain Acute  Goal: Optimal Pain Control and Function  Outcome: Progressing     Problem: Nausea and Vomiting  Goal: Nausea and Vomiting Relief  Outcome: Progressing     Problem: Fall Injury Risk  Goal: Absence of Fall and Fall-Related Injury  Outcome: Progressing     Problem: Skin Injury Risk Increased  Goal: Skin Health and Integrity  Outcome: Progressing

## 2024-12-01 NOTE — SUBJECTIVE & OBJECTIVE
Interval History: Pt seen and examined this morning on dave MCBRIDE. She is continuing to have significant abdominal pain without bowel movement. Reported overnight clot per rectum but patient states it was vaginal bleeding. Care plan reviewed. Otherwise, doing well and with no further complaints at this time.        Objective:     Vital Signs (Most Recent):  Temp: 97.9 °F (36.6 °C) (12/01/24 1106)  Pulse: (!) 49 (12/01/24 1106)  Resp: 20 (12/01/24 1106)  BP: 106/63 (12/01/24 1106)  SpO2: 97 % (12/01/24 1106) Vital Signs (24h Range):  Temp:  [97.4 °F (36.3 °C)-98.6 °F (37 °C)] 97.9 °F (36.6 °C)  Pulse:  [49-65] 49  Resp:  [14-20] 20  SpO2:  [96 %-100 %] 97 %  BP: ()/(58-70) 106/63     Weight: 61.7 kg (136 lb 0.4 oz)  Body mass index is 21.3 kg/m².    Intake/Output Summary (Last 24 hours) at 12/1/2024 1210  Last data filed at 11/30/2024 1706  Gross per 24 hour   Intake 1607.66 ml   Output --   Net 1607.66 ml         Physical Exam  Constitutional:       Appearance: Normal appearance.   HENT:      Head: Normocephalic and atraumatic.      Nose: Nose normal.      Mouth/Throat:      Mouth: Mucous membranes are moist.   Eyes:      Extraocular Movements: Extraocular movements intact.      Pupils: Pupils are equal, round, and reactive to light.   Cardiovascular:      Rate and Rhythm: Normal rate and regular rhythm.      Heart sounds: No murmur heard.     No gallop.   Pulmonary:      Effort: Pulmonary effort is normal.      Breath sounds: Normal breath sounds. No wheezing or rales.   Abdominal:      General: Abdomen is flat. Bowel sounds are normal. There is no distension.      Tenderness: There is abdominal tenderness.      Comments: Stool burden present    Musculoskeletal:         General: No swelling or tenderness. Normal range of motion.      Cervical back: Normal range of motion and neck supple.      Right lower leg: No edema.      Left lower leg: No edema.   Skin:     General: Skin is warm and dry.      Capillary  Refill: Capillary refill takes less than 2 seconds.   Neurological:      General: No focal deficit present.      Mental Status: She is alert. Mental status is at baseline.   Psychiatric:         Mood and Affect: Mood normal.             Significant Labs: All pertinent labs within the past 24 hours have been reviewed.    Significant Imaging: I have reviewed all pertinent imaging results/findings within the past 24 hours.

## 2024-12-01 NOTE — ASSESSMENT & PLAN NOTE
Nausea and vomiting    - afebrile VSS on RA  - WBC 16.28, trending  No significant electrolyte abnormalities  - UA noninfectious but w/ 26 RBC  - CT a/p: Mild asymmetric enlargement of the right ovary of uncertain clinical significance in this patient with reported right lower quadrant pain.  - pelvic US without any significant findings   - MM pain regimen: tylenol 1 g q8h, hold toradol   - prn antiemetics  - stop Zosyn   - no clear cause of her pain. Possibly related to constipation as she has a history of constipation. Completing her cycle. Advanced to regular diet and will re evaluate.   -increase bowel regimen, KUB ordered and consistent with constipation  -will attempt suppository and consider clean out if symptoms do not improve   -concern for difficulty swallowing and weight loss with H&H drop and abdominal pain   -GI consulted given continued abdominal pain and H&H drop   -start IV PPI, scope Monday 12/2/24

## 2024-12-01 NOTE — NURSING
A&O x4. VSS ex bp soft. Spo2 wnl on room air. SR-SB on tele monitor. C/o on abd pain. PRN bentyl given with moderate effects. No bm. Family remains at bedside. No acute events on pm shift. Bed is locked and in lowest position. Care is ongoing. Pt remained free of falls and injuries. Instructed to call staff for assistance. Bed is locked and in lowest position. Call light is within reach.

## 2024-12-01 NOTE — PROGRESS NOTES
[facility] - Stepdown Flex ([place] [id number])  [facility] Medicine  Progress Note    Patient Name: Kavya Bundy  MRN: [id number]  Patient Class: IP- Inpatient   Admission Date: 11/26/2024  Length of Stay: 4 days  Attending Physician: Stella Vila MD  Primary Care Provider: Otilia, Primary Doctor        Subjective:     Principal Problem:Intractable lower abdominal pain        HPI:  Ms. Sweet has a history of IVDU in remission and long term use of methadone at a clinic, recurrent leg abscesses, last seen in June 2024 for an abscess, incision and drainage performed, cultures grew strep intermedius, prescribed bactrim, and now presents for abdominal pain and tenderness along with vomiting. The patient reports abdominal pain started yesterday evening approx 5 hours prior to arrival. She had sudden sharp, stabbing pain in her right lower quadrant. Shortly after the pain began, she experienced intractable vomiting. Her spouse called EMS. She endorses headache and dizziness but no chest pain or palpitations. Two days prior, the patient reported having a fever. She remembered that one of her temperatures were greater than 100. Additionally she reports an abscess on the right lower leg that is purulent and painful. Her spouse says she has recurrent abscesses often and does not complete the antibiotic regimen prescribed. She is currently on her menses. She denied dysuria and hematuria. Her spouse says she was concerned for a UTI due to dysuria two weeks prior to tonight's presentation. Last bowel movement was 11/24.     In ED, Pt afebrile w/ soft BP 94/51, other VSS on RA. WBC 16.28. Hgb 7.9. No significant electrolyte abnormalities. UA noninfectious but w/ 26 RBC. CT a/p: Mild asymmetric enlargement of the right ovary of uncertain clinical significance in this patient with reported right lower quadrant pain. Given IV morphine, IV antiemetics, zosyn and vancomycin and IVF.     Overview/Hospital Course:  Patient admitted for  abdominal pain, no clear cause or source identified. Continue with pain management plan. She was anemic, possibly from menstrual cycles as she has not been having melena or bloody emesis.     Interval History: Pt seen and examined this morning on dave MCBRIDE. She is continuing to have significant abdominal pain without bowel movement. Reported overnight clot per rectum but patient states it was vaginal bleeding. Care plan reviewed. Otherwise, doing well and with no further complaints at this time.        Objective:     Vital Signs (Most Recent):  Temp: 97.9 °F (36.6 °C) (12/01/24 1106)  Pulse: (!) 49 (12/01/24 1106)  Resp: 20 (12/01/24 1106)  BP: 106/63 (12/01/24 1106)  SpO2: 97 % (12/01/24 1106) Vital Signs (24h Range):  Temp:  [97.4 °F (36.3 °C)-98.6 °F (37 °C)] 97.9 °F (36.6 °C)  Pulse:  [49-65] 49  Resp:  [14-20] 20  SpO2:  [96 %-100 %] 97 %  BP: ()/(58-70) 106/63     Weight: 61.7 kg (136 lb 0.4 oz)  Body mass index is 21.3 kg/m².    Intake/Output Summary (Last 24 hours) at 12/1/2024 1210  Last data filed at 11/30/2024 1706  Gross per 24 hour   Intake 1607.66 ml   Output --   Net 1607.66 ml         Physical Exam  Constitutional:       Appearance: Normal appearance.   HENT:      Head: Normocephalic and atraumatic.      Nose: Nose normal.      Mouth/Throat:      Mouth: Mucous membranes are moist.   Eyes:      Extraocular Movements: Extraocular movements intact.      Pupils: Pupils are equal, round, and reactive to light.   Cardiovascular:      Rate and Rhythm: Normal rate and regular rhythm.      Heart sounds: No murmur heard.     No gallop.   Pulmonary:      Effort: Pulmonary effort is normal.      Breath sounds: Normal breath sounds. No wheezing or rales.   Abdominal:      General: Abdomen is flat. Bowel sounds are normal. There is no distension.      Tenderness: There is abdominal tenderness.      Comments: Stool burden present    Musculoskeletal:         General: No swelling or tenderness. Normal range  of motion.      Cervical back: Normal range of motion and neck supple.      Right lower leg: No edema.      Left lower leg: No edema.   Skin:     General: Skin is warm and dry.      Capillary Refill: Capillary refill takes less than 2 seconds.   Neurological:      General: No focal deficit present.      Mental Status: She is alert. Mental status is at baseline.   Psychiatric:         Mood and Affect: Mood normal.             Significant Labs: All pertinent labs within the past 24 hours have been reviewed.    Significant Imaging: I have reviewed all pertinent imaging results/findings within the past 24 hours.    Assessment/Plan:      * Intractable lower abdominal pain  Nausea and vomiting    - afebrile VSS on RA  - WBC 16.28, trending  No significant electrolyte abnormalities  - UA noninfectious but w/ 26 RBC  - CT a/p: Mild asymmetric enlargement of the right ovary of uncertain clinical significance in this patient with reported right lower quadrant pain.  - pelvic US without any significant findings   - MM pain regimen: tylenol 1 g q8h, hold toradol   - prn antiemetics  - stop Zosyn   - no clear cause of her pain. Possibly related to constipation as she has a history of constipation. Completing her cycle. Advanced to regular diet and will re evaluate.   -increase bowel regimen, KUB ordered and consistent with constipation  -will attempt suppository and consider clean out if symptoms do not improve   -concern for difficulty swallowing and weight loss with H&H drop and abdominal pain   -GI consulted given continued abdominal pain and H&H drop   -start IV PPI, scope Monday 12/2/24     Nausea and vomiting  No longer present, has not happened since admission      Acute blood loss anemia  Anemia is likely due to chronic blood loss and menstrual cycles . Most recent hemoglobin and hematocrit are listed below.    Plan  - Monitor serial CBC: Daily  - Transfuse PRBC if patient becomes hemodynamically unstable, symptomatic or  H/H drops below 7/21.  - Patient has received 1 units of PRBCs on 11/27 , remained stable at 7 but then dropped again 11/30 requiring another unit   - Patient's anemia is currently stable  -GI consulted given failure to improve, see plan above         Hypotension  - BP 90/50s  -stable, will continue to monitor       Leg abscess  - WBC 16, improving with treatment   - stop Zosyn and start Augmentin   - wound care consulted    Tobacco abuse  Dangers of cigarette smoking were reviewed with patient in detail for 10 minutes and patient was encouraged to quit. Nicotine replacement options were discussed.    Polysubstance abuse  - denies any recent drug use.   - methadone 110 mg qd, follows with methadone clinic   - consider addiction psych consult if not improving      VTE Risk Mitigation (From admission, onward)           Ordered     IP VTE LOW RISK PATIENT  Once         11/26/24 0931     Place CHERYL hose  Until discontinued         11/26/24 0931     Place sequential compression device  Until discontinued         11/26/24 0931     Place sequential compression device  Until discontinued         11/26/24 0931                    Discharge Planning   ABHINAV: 12/2/2024     Code Status: Full Code   Is the patient medically ready for discharge?:     Reason for patient still in hospital (select all that apply): Patient trending condition, Treatment, and Consult recommendations  Discharge Plan A: Home with family                  Stella Vila MD  Department of Hospital Medicine   [facility] - Stepdown Flex ([place]-14)

## 2024-12-01 NOTE — PLAN OF CARE
Shift Note:     Pt Aaox4, on RA. NPO at midnight tonight for EGD tomorrow. Laxatives and suppository given today- no BM. Pt c/o abd pain and cramps. H/H stable today. BP still soft. Ambulatory to restroom independently. Significant other at bedside and supportive. Bed low and locked, call light in reach. No concerns voiced at this time.       Problem: Adult Inpatient Plan of Care  Goal: Plan of Care Review  Outcome: Progressing  Goal: Patient-Specific Goal (Individualized)  Outcome: Progressing  Goal: Absence of Hospital-Acquired Illness or Injury  Outcome: Progressing  Goal: Optimal Comfort and Wellbeing  Outcome: Progressing  Goal: Readiness for Transition of Care  Outcome: Progressing     Problem: Infection  Goal: Absence of Infection Signs and Symptoms  Outcome: Progressing     Problem: Wound  Goal: Optimal Coping  Outcome: Progressing  Goal: Optimal Functional Ability  Outcome: Progressing  Goal: Absence of Infection Signs and Symptoms  Outcome: Progressing  Goal: Improved Oral Intake  Outcome: Progressing  Goal: Optimal Pain Control and Function  Outcome: Progressing  Goal: Skin Health and Integrity  Outcome: Progressing  Goal: Optimal Wound Healing  Outcome: Progressing     Problem: Pain Acute  Goal: Optimal Pain Control and Function  Outcome: Progressing     Problem: Nausea and Vomiting  Goal: Nausea and Vomiting Relief  Outcome: Progressing     Problem: Fall Injury Risk  Goal: Absence of Fall and Fall-Related Injury  Outcome: Progressing     Problem: Skin Injury Risk Increased  Goal: Skin Health and Integrity  Outcome: Progressing

## 2024-12-02 ENCOUNTER — ANESTHESIA EVENT (OUTPATIENT)
Dept: ENDOSCOPY | Facility: HOSPITAL | Age: 34
DRG: 392 | End: 2024-12-02
Payer: MEDICAID

## 2024-12-02 ENCOUNTER — ANESTHESIA (OUTPATIENT)
Dept: ENDOSCOPY | Facility: HOSPITAL | Age: 34
DRG: 392 | End: 2024-12-02
Payer: MEDICAID

## 2024-12-02 PROCEDURE — D9220A PRA ANESTHESIA: Mod: CRNA,,, | Performed by: NURSE ANESTHETIST, CERTIFIED REGISTERED

## 2024-12-02 PROCEDURE — 94799 UNLISTED PULMONARY SVC/PX: CPT

## 2024-12-02 PROCEDURE — 43239 EGD BIOPSY SINGLE/MULTIPLE: CPT | Performed by: STUDENT IN AN ORGANIZED HEALTH CARE EDUCATION/TRAINING PROGRAM

## 2024-12-02 PROCEDURE — 94760 N-INVAS EAR/PLS OXIMETRY 1: CPT

## 2024-12-02 PROCEDURE — 43239 EGD BIOPSY SINGLE/MULTIPLE: CPT | Mod: ,,, | Performed by: STUDENT IN AN ORGANIZED HEALTH CARE EDUCATION/TRAINING PROGRAM

## 2024-12-02 PROCEDURE — 37000009 HC ANESTHESIA EA ADD 15 MINS: Performed by: STUDENT IN AN ORGANIZED HEALTH CARE EDUCATION/TRAINING PROGRAM

## 2024-12-02 PROCEDURE — 63600175 PHARM REV CODE 636 W HCPCS: Performed by: NURSE ANESTHETIST, CERTIFIED REGISTERED

## 2024-12-02 PROCEDURE — 25000003 PHARM REV CODE 250: Performed by: PHYSICIAN ASSISTANT

## 2024-12-02 PROCEDURE — 25000003 PHARM REV CODE 250: Performed by: STUDENT IN AN ORGANIZED HEALTH CARE EDUCATION/TRAINING PROGRAM

## 2024-12-02 PROCEDURE — 37000008 HC ANESTHESIA 1ST 15 MINUTES: Performed by: STUDENT IN AN ORGANIZED HEALTH CARE EDUCATION/TRAINING PROGRAM

## 2024-12-02 PROCEDURE — 20600001 HC STEP DOWN PRIVATE ROOM

## 2024-12-02 PROCEDURE — 27201012 HC FORCEPS, HOT/COLD, DISP: Performed by: STUDENT IN AN ORGANIZED HEALTH CARE EDUCATION/TRAINING PROGRAM

## 2024-12-02 PROCEDURE — D9220A PRA ANESTHESIA: Mod: ANES,,, | Performed by: ANESTHESIOLOGY

## 2024-12-02 PROCEDURE — 94761 N-INVAS EAR/PLS OXIMETRY MLT: CPT

## 2024-12-02 PROCEDURE — 99900035 HC TECH TIME PER 15 MIN (STAT)

## 2024-12-02 PROCEDURE — 88305 TISSUE EXAM BY PATHOLOGIST: CPT | Performed by: STUDENT IN AN ORGANIZED HEALTH CARE EDUCATION/TRAINING PROGRAM

## 2024-12-02 PROCEDURE — 0DJ08ZZ INSPECTION OF UPPER INTESTINAL TRACT, VIA NATURAL OR ARTIFICIAL OPENING ENDOSCOPIC: ICD-10-PCS | Performed by: STUDENT IN AN ORGANIZED HEALTH CARE EDUCATION/TRAINING PROGRAM

## 2024-12-02 PROCEDURE — A4216 STERILE WATER/SALINE, 10 ML: HCPCS | Performed by: PHYSICIAN ASSISTANT

## 2024-12-02 PROCEDURE — 88342 IMHCHEM/IMCYTCHM 1ST ANTB: CPT | Performed by: STUDENT IN AN ORGANIZED HEALTH CARE EDUCATION/TRAINING PROGRAM

## 2024-12-02 RX ORDER — PROPOFOL 10 MG/ML
VIAL (ML) INTRAVENOUS
Status: DISCONTINUED | OUTPATIENT
Start: 2024-12-02 | End: 2024-12-02

## 2024-12-02 RX ORDER — ONDANSETRON HYDROCHLORIDE 2 MG/ML
4 INJECTION, SOLUTION INTRAVENOUS DAILY PRN
Status: DISCONTINUED | OUTPATIENT
Start: 2024-12-02 | End: 2024-12-02 | Stop reason: HOSPADM

## 2024-12-02 RX ORDER — PROPOFOL 10 MG/ML
VIAL (ML) INTRAVENOUS CONTINUOUS PRN
Status: DISCONTINUED | OUTPATIENT
Start: 2024-12-02 | End: 2024-12-02

## 2024-12-02 RX ORDER — PANTOPRAZOLE SODIUM 40 MG/1
40 TABLET, DELAYED RELEASE ORAL DAILY
Status: DISCONTINUED | OUTPATIENT
Start: 2024-12-03 | End: 2024-12-03 | Stop reason: HOSPADM

## 2024-12-02 RX ORDER — LIDOCAINE HYDROCHLORIDE 20 MG/ML
INJECTION INTRAVENOUS
Status: DISCONTINUED | OUTPATIENT
Start: 2024-12-02 | End: 2024-12-02

## 2024-12-02 RX ORDER — GLUCAGON 1 MG
1 KIT INJECTION
Status: DISCONTINUED | OUTPATIENT
Start: 2024-12-02 | End: 2024-12-02 | Stop reason: HOSPADM

## 2024-12-02 RX ADMIN — METHADONE HYDROCHLORIDE 110 MG: 10 TABLET ORAL at 02:12

## 2024-12-02 RX ADMIN — ACETAMINOPHEN 1000 MG: 500 TABLET ORAL at 09:12

## 2024-12-02 RX ADMIN — Medication 10 ML: at 03:12

## 2024-12-02 RX ADMIN — Medication 10 ML: at 10:12

## 2024-12-02 RX ADMIN — LIDOCAINE HYDROCHLORIDE 60 MG: 20 INJECTION INTRAVENOUS at 10:12

## 2024-12-02 RX ADMIN — PROPOFOL 175 MCG/KG/MIN: 10 INJECTION, EMULSION INTRAVENOUS at 10:12

## 2024-12-02 RX ADMIN — Medication 10 ML: at 06:12

## 2024-12-02 RX ADMIN — ACETAMINOPHEN 1000 MG: 500 TABLET ORAL at 05:12

## 2024-12-02 RX ADMIN — PROPOFOL 70 MG: 10 INJECTION, EMULSION INTRAVENOUS at 10:12

## 2024-12-02 RX ADMIN — DICYCLOMINE HYDROCHLORIDE 10 MG: 10 CAPSULE ORAL at 06:12

## 2024-12-02 NOTE — PROVATION PATIENT INSTRUCTIONS
Discharge Summary/Instructions after an Endoscopic Procedure  Patient Name: Kavya Bundy  Patient MRN: [id number]  Patient YOB: 1990  Monday, December 2, 2024  Raul Muse MD  Dear patient,  As a result of recent federal legislation (The Federal Cures Act), you may   receive lab or pathology results from your procedure in your [facility]   account before your physician is able to contact you. Your physician or   their representative will relay the results to you with their   recommendations at their soonest availability.  Thank you,  RESTRICTIONS:  During your procedure today, you received medications for sedation.  These   medications may affect your judgment, balance and coordination.  Therefore,   for 24 hours, you have the following restrictions:   - DO NOT drive a car, operate machinery, make legal/financial decisions,   sign important papers or drink alcohol.    ACTIVITY:  Today: no heavy lifting, straining or running due to procedural   sedation/anesthesia.  The following day: return to full activity including work.  DIET:  Eat and drink normally unless instructed otherwise.     TREATMENT FOR COMMON SIDE EFFECTS:  - Mild abdominal pain, nausea, belching, bloating or excessive gas:  rest,   eat lightly and use a heating pad.  - Sore Throat: treat with throat lozenges and/or gargle with warm salt   water.  - Because air was used during the procedure, expelling large amounts of air   from your rectum or belching is normal.  - If a bowel prep was taken, you may not have a bowel movement for 1-3 days.    This is normal.  SYMPTOMS TO WATCH FOR AND REPORT TO YOUR PHYSICIAN:  1. Abdominal pain or bloating, other than gas cramps.  2. Chest pain.  3. Back pain.  4. Signs of infection such as: chills or fever occurring within 24 hours   after the procedure.  5. Rectal bleeding, which would show as bright red, maroon, or black stools.   (A tablespoon of blood from the rectum is not serious,  especially if   hemorrhoids are present.)  6. Vomiting.  7. Weakness or dizziness.  GO DIRECTLY TO THE NEAREST EMERGENCY ROOM IF YOU HAVE ANY OF THE FOLLOWING:      Difficulty breathing              Chills and/or fever over 101 F   Persistent vomiting and/or vomiting blood   Severe abdominal pain   Severe chest pain   Black, tarry stools   Bleeding- more than one tablespoon   Any other symptom or condition that you feel may need urgent attention  Your doctor recommends these additional instructions:  If any biopsies were taken, your doctors clinic will contact you in 1 to 2   weeks with any results.  - [facility].   - Advance diet as tolerated.   - Continue present medications.   - Await pathology results.  For questions, problems or results please call your physician - Raul Muse MD at Work:  [phone].  [facility], EMERGENCY ROOM PHONE NUMBER: [phone]  IF A COMPLICATION OR EMERGENCY SITUATION ARISES AND YOU ARE UNABLE TO REACH   YOUR PHYSICIAN - GO DIRECTLY TO THE EMERGENCY ROOM.  Raul Muse MD  12/2/2024 10:44:15 AM  This report has been verified and signed electronically.  Dear patient,  As a result of recent federal legislation (The Federal Cures Act), you may   receive lab or pathology results from your procedure in your [facility]   account before your physician is able to contact you. Your physician or   their representative will relay the results to you with their   recommendations at their soonest availability.  Thank you,  PROVATION

## 2024-12-02 NOTE — PLAN OF CARE
During my shift, pt AAOx4, NAD. VS stable. Pt remains afebrile. SPO2 wnl on room air. Pt reports lower quadrant abd pain that has been managed with Tylenol and Bentyl. Pt NPO since midnight. No BM noted. Pt remains free from falls or injury. Bed is lowered and locked. Call light is within reach. Pt has no known needs at this time.     Problem: Adult Inpatient Plan of Care  Goal: Plan of Care Review  Outcome: Progressing  Goal: Patient-Specific Goal (Individualized)  Outcome: Progressing  Goal: Absence of Hospital-Acquired Illness or Injury  Outcome: Progressing  Goal: Optimal Comfort and Wellbeing  Outcome: Progressing  Goal: Readiness for Transition of Care  Outcome: Progressing     Problem: Infection  Goal: Absence of Infection Signs and Symptoms  Outcome: Progressing     Problem: Wound  Goal: Optimal Coping  Outcome: Progressing  Goal: Optimal Functional Ability  Outcome: Progressing  Goal: Absence of Infection Signs and Symptoms  Outcome: Progressing  Goal: Improved Oral Intake  Outcome: Progressing  Goal: Optimal Pain Control and Function  Outcome: Progressing  Goal: Skin Health and Integrity  Outcome: Progressing  Goal: Optimal Wound Healing  Outcome: Progressing     Problem: Nausea and Vomiting  Goal: Nausea and Vomiting Relief  Outcome: Progressing     Problem: Pain Acute  Goal: Optimal Pain Control and Function  Outcome: Progressing     Problem: Fall Injury Risk  Goal: Absence of Fall and Fall-Related Injury  Outcome: Progressing     Problem: Skin Injury Risk Increased  Goal: Skin Health and Integrity  Outcome: Progressing

## 2024-12-02 NOTE — SUBJECTIVE & OBJECTIVE
Interval History: Pt seen and examined this morning on dave [facility]BR[facility]. S/P EGD with GI and tolerated this. Still having abdominal pain. Care plan reviewed. Otherwise, doing well and with no further complaints at this time.        Objective:     Vital Signs (Most Recent):  Temp: 98.6 °F (37 °C) (12/02/24 1130)  Pulse: (!) 53 (12/02/24 1130)  Resp: 18 (12/02/24 1459)  BP: (!) 95/58 (12/02/24 1130)  SpO2: 96 % (12/02/24 1308) Vital Signs (24h Range):  Temp:  [97.5 °F (36.4 °C)-98.6 °F (37 °C)] 98.6 °F (37 °C)  Pulse:  [51-64] 53  Resp:  [10-20] 18  SpO2:  [96 %-100 %] 96 %  BP: ()/(53-68) 95/58     Weight: 61.7 kg (136 lb 0.4 oz)  Body mass index is 21.3 kg/m².    Intake/Output Summary (Last 24 hours) at 12/2/2024 1513  Last data filed at 12/2/2024 1330  Gross per 24 hour   Intake 118 ml   Output 0 ml   Net 118 ml         Physical Exam  Constitutional:       Appearance: Normal appearance.   HENT:      Head: Normocephalic and atraumatic.      Nose: Nose normal.      Mouth/Throat:      Mouth: Mucous membranes are moist.   Eyes:      Extraocular Movements: Extraocular movements intact.      Pupils: Pupils are equal, round, and reactive to light.   Cardiovascular:      Rate and Rhythm: Normal rate and regular rhythm.      Heart sounds: No murmur heard.     No gallop.   Pulmonary:      Effort: Pulmonary effort is normal.      Breath sounds: Normal breath sounds. No wheezing or rales.   Abdominal:      General: Abdomen is flat. Bowel sounds are normal. There is no distension.      Tenderness: There is abdominal tenderness.      Comments: Stool burden present    Musculoskeletal:         General: No swelling or tenderness. Normal range of motion.      Cervical back: Normal range of motion and neck supple.      Right lower leg: No edema.      Left lower leg: No edema.   Skin:     General: Skin is warm and dry.      Capillary Refill: Capillary refill takes less than 2 seconds.   Neurological:      General: No focal deficit  present.      Mental Status: She is alert. Mental status is at baseline.   Psychiatric:         Mood and Affect: Mood normal.             Significant Labs: All pertinent labs within the past 24 hours have been reviewed.    Significant Imaging: I have reviewed all pertinent imaging results/findings within the past 24 hours.

## 2024-12-02 NOTE — H&P
Short Stay Endoscopy History and Physical    PCP - No, Primary Doctor     Procedure - EGD  ASA - per anesthesia  Mallampati - per anesthesia  History of Anesthesia problems - no  Family history Anesthesia problems -  no   Plan of anesthesia - General    HPI:  This is a 34 y.o. female here for evaluation of : abdominal pain and nausea.    ROS:  Constitutional: No fevers, chills, No weight loss  CV: No chest pain  Pulm: No cough, No shortness of breath  Ophtho: No vision changes  GI: see HPI  Derm: No rash    Medical History:  has a past medical history of intravenous drug use in remission and Long-term current use of methadone for opiate dependence.    Surgical History:  has a past surgical history that includes  section and Tubal ligation.    Family History: family history is not on file.. Otherwise no colon cancer, inflammatory bowel disease, or GI malignancies.    Social History:  reports that she has been smoking cigarettes. She has a 2 pack-year smoking history. She has never used smokeless tobacco. She reports that she does not drink alcohol and does not use drugs.    Review of patient's allergies indicates:  No Known Allergies    Medications:   Medications Prior to Admission   Medication Sig Dispense Refill Last Dose/Taking    methadone (DOLOPHINE) 10 MG tablet Take 110 mg by mouth once daily.   Taking       Physical Exam:    Vital Signs:   Vitals:    24 0911   BP: 96/61   Pulse: (!) 51   Resp: 20   Temp: 98 °F (36.7 °C)       General Appearance: Well appearing in no acute distress  Abdomen: non distended     Labs:  Lab Results   Component Value Date    WBC 7.10 2024    HGB 7.8 (L) 2024    HCT 25.7 (L) 2024     2024    ALT 8 (L) 2024    AST 12 2024     2024    K 3.8 2024     2024    CREATININE 0.6 2024    BUN 13 2024    CO2 26 2024    TSH 0.932 2014    INR 1.0 2018       I have explained the  risks and benefits of endoscopy procedures to the patient including but not limited to bleeding, perforation, infection, and death.  The patient was asked if they understand and allowed to ask any further questions to their satisfaction.      Samara Daniels MD

## 2024-12-02 NOTE — PROGRESS NOTES
..Nursing Transfer Note      Reason patient is being transferred: procedure care complete     Transfer To: [id number]    Transfer via stretcher    Transfer with n/a    Transported by PCT     Medicines sent: n/a    Any special needs or follow-up needed: routine    Chart send with patient: Yes    Notified: no one on file    Patient reassessed at: 1050 12/2/24 (date, time)

## 2024-12-02 NOTE — ASSESSMENT & PLAN NOTE
Anemia is likely due to chronic blood loss and menstrual cycles . Most recent hemoglobin and hematocrit are listed below.    Plan  - Monitor serial CBC: Daily  - Transfuse PRBC if patient becomes hemodynamically unstable, symptomatic or H/H drops below 7/21.  - Patient has received 1 units of PRBCs on 11/27 , remained stable at 7 but then dropped again 11/30 requiring another unit   - Patient's anemia is currently stable  -GI consulted given failure to improve, see plan above

## 2024-12-02 NOTE — ASSESSMENT & PLAN NOTE
Nausea and vomiting    - afebrile VSS on RA  - WBC 16.28, trending  No significant electrolyte abnormalities  - UA noninfectious but w/ 26 RBC  - CT a/p: Mild asymmetric enlargement of the right ovary of uncertain clinical significance in this patient with reported right lower quadrant pain.  - pelvic US without any significant findings   - MM pain regimen: tylenol 1 g q8h, hold toradol   - prn antiemetics  - stop Zosyn   - no clear cause of her pain. Possibly related to constipation as she has a history of constipation. Completing her cycle. Advanced to regular diet and will re evaluate.   -increase bowel regimen, KUB ordered and consistent with constipation  -attempted suppository without bowel movement, enema given with bowel movement   -concern for difficulty swallowing and weight loss with H&H drop and abdominal pain   -GI consulted given continued abdominal pain and H&H drop   -s/p EGD with normal findings, biopsy taken

## 2024-12-02 NOTE — TREATMENT PLAN
GI Treatment Plan    EGD 12/2/24  Impression:              - Normal esophagus.                          - Normal stomach. Biopsied.                          - Normal examined duodenum.   Recommendation:          - [facility] for [facility] care.                          - Advance diet as tolerated.                          - Continue present medications.                          - Await pathology results.       GI will sign off.    Stephani Swift MD  Gastroenterology and Hepatology Fellow, PGY-4

## 2024-12-02 NOTE — TRANSFER OF CARE
"Anesthesia Transfer of Care Note    Patient: Kavya Bundy    Procedure(s) Performed: Procedure(s) (LRB):  EGD (ESOPHAGOGASTRODUODENOSCOPY) (N/A)    Patient location: PACU    Anesthesia Type: general    Transport from OR: Transported from OR on room air with adequate spontaneous ventilation    Post pain: adequate analgesia    Post assessment: no apparent anesthetic complications and tolerated procedure well    Post vital signs: stable    Level of consciousness: awake, alert and oriented    Nausea/Vomiting: no nausea/vomiting    Complications: none    Transfer of care protocol was followed      Last vitals: Visit Vitals  /65 (BP Location: Left arm, Patient Position: Lying)   Pulse 64   Temp 36.4 °C (97.5 °F) (Temporal)   Resp 10   Ht 5' 7" (1.702 m)   Wt 61.7 kg (136 lb 0.4 oz)   SpO2 100%   Breastfeeding No   BMI 21.30 kg/m²     "

## 2024-12-02 NOTE — PROGRESS NOTES
The pt was sent a dose of Methodone on 11/30 that was not given to the pt. Methodone 110mg  (11- 10mg tablets) intact given to [facility], inpatient pharmacy, for return.

## 2024-12-02 NOTE — ANESTHESIA PREPROCEDURE EVALUATION
2024  Kavya Bundy is a 34 y.o., female with n/v which has not happened in a couple of days.     Past Medical History:   Diagnosis Date    Hx of intravenous drug use in remission     Long-term current use of methadone for opiate dependence        Past Surgical History:   Procedure Laterality Date     SECTION      x2    TUBAL LIGATION             Pre-op Assessment          Review of Systems  Anesthesia Hx:  No problems with previous Anesthesia   History of prior surgery of interest to airway management or planning:          Denies Family Hx of Anesthesia complications.    Denies Personal Hx of Anesthesia complications.                    Cardiovascular:  Cardiovascular Normal                                              Pulmonary:  Pulmonary Normal    Denies Asthma.    Denies Recent URI.                 Neurological:  Neurology Normal                                          Physical Exam  General: Alert, Oriented and Well nourished    Airway:  Mallampati: II   Mouth Opening: Normal  TM Distance: Normal  Neck ROM: Normal ROM    Dental:    Chest/Lungs:  Normal Respiratory Rate    Heart:  Rate: Normal  Rhythm: Regular Rhythm        Anesthesia Plan  Type of Anesthesia, risks & benefits discussed:    Anesthesia Type: Gen Natural Airway  Intra-op Monitoring Plan: Standard ASA Monitors  Post Op Pain Control Plan: multimodal analgesia and IV/PO Opioids PRN  Informed Consent: Informed consent signed with the Patient and all parties understand the risks and agree with anesthesia plan.  All questions answered.   ASA Score: 2  Day of Surgery Review of History & Physical: H&P Update referred to the surgeon/provider.    Ready For Surgery From Anesthesia Perspective.     .

## 2024-12-02 NOTE — PROGRESS NOTES
[facility] - [facility] ([place] [id number])  [facility]  Progress Note    Patient Name: Kavya Bundy  MRN: [id number]  Patient Class: IP- Inpatient   Admission Date: 11/26/2024  Length of Stay: 5 days  Attending Physician: Stella Vila MD  Primary Care Provider: Otilia, Primary Doctor        Subjective     Principal Problem:Intractable lower abdominal pain        HPI:  Ms. Sweet has a history of IVDU in remission and long term use of methadone at a clinic, recurrent leg abscesses, last seen in June 2024 for an abscess, incision and drainage performed, cultures grew strep intermedius, prescribed bactrim, and now presents for abdominal pain and tenderness along with vomiting. The patient reports abdominal pain started yesterday evening approx 5 hours prior to arrival. She had sudden sharp, stabbing pain in her right lower quadrant. Shortly after the pain began, she experienced intractable vomiting. Her spouse called EMS. She endorses headache and dizziness but no chest pain or palpitations. Two days prior, the patient reported having a fever. She remembered that one of her temperatures were greater than 100. Additionally she reports an abscess on the right lower leg that is purulent and painful. Her spouse says she has recurrent abscesses often and does not complete the antibiotic regimen prescribed. She is currently on her menses. She denied dysuria and hematuria. Her spouse says she was concerned for a UTI due to dysuria two weeks prior to tonight's presentation. Last bowel movement was 11/24.     In ED, Pt afebrile w/ soft BP 94/51, other VSS on RA. WBC 16.28. Hgb 7.9. No significant electrolyte abnormalities. UA noninfectious but w/ 26 RBC. CT a/p: Mild asymmetric enlargement of the right ovary of uncertain clinical significance in this patient with reported right lower quadrant pain. Given IV morphine, IV antiemetics, zosyn and vancomycin and IVF.     Overview/Hospital Course:  Patient admitted for  abdominal pain, no clear cause or source identified. Continue with pain management plan. She was anemic, possibly from menstrual cycles as she has not been having melena or bloody emesis.     Interval History: Pt seen and examined this morning on rounds. REED. S/P EGD with GI and tolerated this. Still having abdominal pain. Care plan reviewed. Otherwise, doing well and with no further complaints at this time.        Objective:     Vital Signs (Most Recent):  Temp: 98.6 °F (37 °C) (12/02/24 1130)  Pulse: (!) 53 (12/02/24 1130)  Resp: 18 (12/02/24 1459)  BP: (!) 95/58 (12/02/24 1130)  SpO2: 96 % (12/02/24 1308) Vital Signs (24h Range):  Temp:  [97.5 °F (36.4 °C)-98.6 °F (37 °C)] 98.6 °F (37 °C)  Pulse:  [51-64] 53  Resp:  [10-20] 18  SpO2:  [96 %-100 %] 96 %  BP: ()/(53-68) 95/58     Weight: 61.7 kg (136 lb 0.4 oz)  Body mass index is 21.3 kg/m².    Intake/Output Summary (Last 24 hours) at 12/2/2024 1513  Last data filed at 12/2/2024 1330  Gross per 24 hour   Intake 118 ml   Output 0 ml   Net 118 ml         Physical Exam  Constitutional:       Appearance: Normal appearance.   HENT:      Head: Normocephalic and atraumatic.      Nose: Nose normal.      Mouth/Throat:      Mouth: Mucous membranes are moist.   Eyes:      Extraocular Movements: Extraocular movements intact.      Pupils: Pupils are equal, round, and reactive to light.   Cardiovascular:      Rate and Rhythm: Normal rate and regular rhythm.      Heart sounds: No murmur heard.     No gallop.   Pulmonary:      Effort: Pulmonary effort is normal.      Breath sounds: Normal breath sounds. No wheezing or rales.   Abdominal:      General: Abdomen is flat. Bowel sounds are normal. There is no distension.      Tenderness: There is abdominal tenderness.      Comments: Stool burden present    Musculoskeletal:         General: No swelling or tenderness. Normal range of motion.      Cervical back: Normal range of motion and neck supple.      Right lower leg: No  edema.      Left lower leg: No edema.   Skin:     General: Skin is warm and dry.      Capillary Refill: Capillary refill takes less than 2 seconds.   Neurological:      General: No focal deficit present.      Mental Status: She is alert. Mental status is at baseline.   Psychiatric:         Mood and Affect: Mood normal.             Significant Labs: All pertinent labs within the past 24 hours have been reviewed.    Significant Imaging: I have reviewed all pertinent imaging results/findings within the past 24 hours.    Assessment and Plan     * Intractable lower abdominal pain  Nausea and vomiting    - afebrile VSS on RA  - WBC 16.28, trending  No significant electrolyte abnormalities  - UA noninfectious but w/ 26 RBC  - CT a/p: Mild asymmetric enlargement of the right ovary of uncertain clinical significance in this patient with reported right lower quadrant pain.  - pelvic US without any significant findings   - MM pain regimen: tylenol 1 g q8h, hold toradol   - prn antiemetics  - stop Zosyn   - no clear cause of her pain. Possibly related to constipation as she has a history of constipation. Completing her cycle. Advanced to regular diet and will re evaluate.   -increase bowel regimen, KUB ordered and consistent with constipation  -attempted suppository without bowel movement, enema given with bowel movement   -concern for difficulty swallowing and weight loss with H&H drop and abdominal pain   -GI consulted given continued abdominal pain and H&H drop   -s/p EGD with normal findings, biopsy taken     Nausea and vomiting  No longer present, has not happened since admission      Acute blood loss anemia  Anemia is likely due to chronic blood loss and menstrual cycles . Most recent hemoglobin and hematocrit are listed below.    Plan  - Monitor serial CBC: Daily  - Transfuse PRBC if patient becomes hemodynamically unstable, symptomatic or H/H drops below 7/21.  - Patient has received 1 units of PRBCs on 11/27 , remained  stable at 7 but then dropped again 11/30 requiring another unit   - Patient's anemia is currently stable  -GI consulted given failure to improve, see plan above         Hypotension  - BP 90/50s  -stable, will continue to monitor       Leg abscess  - WBC 16, improving with treatment   - stop Zosyn and start Augmentin   - wound care consulted    Tobacco abuse  Dangers of cigarette smoking were reviewed with patient in detail for 10 minutes and patient was encouraged to quit. Nicotine replacement options were discussed.    Polysubstance abuse  - denies any recent drug use.   - methadone 110 mg qd, follows with methadone clinic   - consider addiction psych consult if not improving      VTE Risk Mitigation (From admission, onward)           Ordered     IP VTE LOW RISK PATIENT  Once         11/26/24 0931     Place CHERYL hose  Until discontinued         11/26/24 0931     Place sequential compression device  Until discontinued         11/26/24 0931     Place sequential compression device  Until discontinued         11/26/24 0931                    Discharge Planning   ABHINAV: 12/4/2024     Code Status: Full Code   Is the patient medically ready for discharge?:     Reason for patient still in hospital (select all that apply):   Discharge Plan A: Home with family                        Stella Vila MD  Department of Hospital Medicine   [facility] ([place])

## 2024-12-03 VITALS
RESPIRATION RATE: 18 BRPM | OXYGEN SATURATION: 96 % | BODY MASS INDEX: 21.66 KG/M2 | WEIGHT: 138 LBS | TEMPERATURE: 98 F | HEIGHT: 67 IN | DIASTOLIC BLOOD PRESSURE: 51 MMHG | SYSTOLIC BLOOD PRESSURE: 90 MMHG | HEART RATE: 54 BPM

## 2024-12-03 LAB
ERYTHROCYTE [DISTWIDTH] IN BLOOD BY AUTOMATED COUNT: 22.4 % (ref 11.5–14.5)
HCT VFR BLD AUTO: 30 % (ref 37–48.5)
HGB BLD-MCNC: 9 G/DL (ref 12–16)
MCH RBC QN AUTO: 20.7 PG (ref 27–31)
MCHC RBC AUTO-ENTMCNC: 30 G/DL (ref 32–36)
MCV RBC AUTO: 69 FL (ref 82–98)
PLATELET # BLD AUTO: 300 K/UL (ref 150–450)
PMV BLD AUTO: 11.6 FL (ref 9.2–12.9)
RBC # BLD AUTO: 4.34 M/UL (ref 4–5.4)
TSH SERPL DL<=0.005 MIU/L-ACNC: 1.99 UIU/ML (ref 0.4–4)
WBC # BLD AUTO: 6.71 K/UL (ref 3.9–12.7)

## 2024-12-03 PROCEDURE — A4216 STERILE WATER/SALINE, 10 ML: HCPCS | Performed by: PHYSICIAN ASSISTANT

## 2024-12-03 PROCEDURE — 84443 ASSAY THYROID STIM HORMONE: CPT | Performed by: STUDENT IN AN ORGANIZED HEALTH CARE EDUCATION/TRAINING PROGRAM

## 2024-12-03 PROCEDURE — 25000003 PHARM REV CODE 250: Performed by: STUDENT IN AN ORGANIZED HEALTH CARE EDUCATION/TRAINING PROGRAM

## 2024-12-03 PROCEDURE — 25000003 PHARM REV CODE 250: Performed by: PHYSICIAN ASSISTANT

## 2024-12-03 PROCEDURE — 85027 COMPLETE CBC AUTOMATED: CPT | Performed by: STUDENT IN AN ORGANIZED HEALTH CARE EDUCATION/TRAINING PROGRAM

## 2024-12-03 RX ORDER — POLYETHYLENE GLYCOL 3350 17 G/17G
17 POWDER, FOR SOLUTION ORAL DAILY
Qty: 510 G | Refills: 0 | Status: SHIPPED | OUTPATIENT
Start: 2024-12-03 | End: 2025-01-02

## 2024-12-03 RX ORDER — DICYCLOMINE HYDROCHLORIDE 10 MG/1
10 CAPSULE ORAL 4 TIMES DAILY PRN
Qty: 56 CAPSULE | Refills: 0 | Status: SHIPPED | OUTPATIENT
Start: 2024-12-03 | End: 2024-12-17

## 2024-12-03 RX ORDER — FERROUS SULFATE 325(65) MG
325 TABLET ORAL DAILY
Qty: 30 TABLET | Refills: 0 | Status: SHIPPED | OUTPATIENT
Start: 2024-12-03 | End: 2025-01-02

## 2024-12-03 RX ORDER — FAMOTIDINE 40 MG/1
40 TABLET, FILM COATED ORAL DAILY
Qty: 30 TABLET | Refills: 0 | Status: SHIPPED | OUTPATIENT
Start: 2024-12-03 | End: 2025-12-03

## 2024-12-03 RX ADMIN — METHADONE HYDROCHLORIDE 110 MG: 10 TABLET ORAL at 08:12

## 2024-12-03 RX ADMIN — PANTOPRAZOLE SODIUM 40 MG: 40 TABLET, DELAYED RELEASE ORAL at 08:12

## 2024-12-03 RX ADMIN — ALUMINUM HYDROXIDE, MAGNESIUM HYDROXIDE, AND DIMETHICONE 30 ML: 400; 400; 40 SUSPENSION ORAL at 08:12

## 2024-12-03 RX ADMIN — Medication 10 ML: at 06:12

## 2024-12-03 NOTE — PLAN OF CARE
[facility] - Stepdown Flex ([place]-)  Discharge Final Note    Primary Care Provider Dr Mishel Velazquez     Expected Discharge Date: 12/3/2024  Discharge Plan A and Plan B have been determined by review of patient's clinical status, future medical and therapeutic needs, and coverage/benefits for post-acute care in coordination with multidisciplinary team members.     Final Discharge Note (most recent)       Final Note - 12/03/24 1208          Final Note    Assessment Type Final Discharge Note     Anticipated Discharge Disposition Planned Readmission - Home or self care     What phone number can be called within the next 1-3 days to see how you are doing after discharge? [phone]     Hospital Resources/Appts/Education Provided Appointments scheduled and added to AVS        Post-Acute Status    Post-Acute Authorization Other     Coverage : MEDICAID - AETNA [facility] -     Other Status No Post-Acute Service Needs     Discharge Delays None known at this time                                  Contact Info       [facility]    [address] [address]       Next Steps: Go on 12/16/2024    Instructions: Follow up 12/16 at [address] [place] [address] - Gastroenterology   Specialty: Gastroenterology    [address] [facility] [address]   Phone: [phone]       Next Steps: Follow up    [facility]- Obgy   Specialty: Obstetrics and Gynecology    [address] [address]   Phone: [phone]       Next Steps: Follow up            1015 am  Patient will need a letter from attending regarding the patient beng administered methadone while in patient. Patient received her requested letter.     Hospital follow ups: Referrals faxed to the following :    [facility] - Gastroenterology  O: [phone] F: [phone]  Your fax has been successfully sent to [phone] at  "[phone].  ------------------------------------------------------------  From: [id number]  ------------------------------------------------------------  12/3/2024 12:26:50 PM Transmission Record          Sent to [phone] with remote ID "[id number]       "          Result: (0/339;0/0) Success          Page record: 1 - 22          Elapsed time: 07:32 on channel 67     [facility] Gynecology : [phone] F: [phone]    Your fax has been successfully sent to [phone] at [phone].  ------------------------------------------------------------  From: [id number]  ------------------------------------------------------------  12/3/2024 12:25:38 PM Transmission Record          Sent to [phone] with remote ID "[id number]      "          Result: (0/339;0/0) Success          Page record: 1 - 12          Elapsed time: 04:30 on channel 2    1224 pm  NANDO spoke with shayna and once referrals have been sent, a nurse will call  the patient at number listed and number confirmed with patient  to schedule follow up appointments. Patient updated that a nurse will call from [facility] with a date/time of hospital follow up with GI and GYN clinic. Patient verbalized an understanding        CM met with patient and boyfriend Jhonny  and discussed discharge plans, patient to DC home with no needs.  Patients  medications delivered to bedside,   and follow up appointment[s] scheduled.   Transportation provided by patients grand father, Monty.     Savanna Perez RN  Case Management  [facility]  [phone]    "

## 2024-12-03 NOTE — NURSING
Patient and her significant other has been advise that the patient is not allowed to leave the unit while she is admitted due to unit policy. Patient and significant other verbalized understanding.

## 2024-12-03 NOTE — ANESTHESIA POSTPROCEDURE EVALUATION
Anesthesia Post Evaluation    Patient: Kavya Bundy    Procedure(s) Performed: Procedure(s) (LRB):  EGD (ESOPHAGOGASTRODUODENOSCOPY) (N/A)    Final Anesthesia Type: general      Patient location during evaluation: PACU  Patient participation: Yes- Able to Participate  Level of consciousness: awake and alert  Post-procedure vital signs: reviewed and stable  Pain management: adequate  Airway patency: patent    PONV status at discharge: No PONV  Anesthetic complications: no      Cardiovascular status: blood pressure returned to baseline  Respiratory status: unassisted and spontaneous ventilation  Hydration status: euvolemic  Follow-up not needed.            See PACU vitals    Event Time   Out of Recovery 11:06:31         Pain/Joy Score: Pain Rating Prior to Med Admin: 3 (12/2/2024  9:28 PM)  Pain Rating Post Med Admin: 0 (12/2/2024 10:24 PM)  Joy Score: 10 (12/2/2024 10:45 AM)

## 2024-12-03 NOTE — PLAN OF CARE
Problem: Infection  Goal: Absence of Infection Signs and Symptoms  Outcome: Progressing     Problem: Pain Acute  Goal: Optimal Pain Control and Function  Outcome: Progressing     Problem: Nausea and Vomiting  Goal: Nausea and Vomiting Relief  Outcome: Progressing     Problem: Wound  Goal: Optimal Coping  Outcome: Progressing  Goal: Optimal Functional Ability  Outcome: Progressing  Goal: Absence of Infection Signs and Symptoms  Outcome: Progressing  Goal: Improved Oral Intake  Outcome: Progressing  Goal: Optimal Pain Control and Function  Outcome: Progressing  Goal: Skin Health and Integrity  Outcome: Progressing  Goal: Optimal Wound Healing  Outcome: Progressing     Patient alert and oriented x 4. VSS. Room air. EGD complete. Bowel regimen. BM x 2. Methadone scheduled.

## 2024-12-03 NOTE — PLAN OF CARE
To whom it may concern,      Kavya Bundy was hospitalized under my care from 11/26/24 to 12/3/24 at [facility]. She remained on Methadone during this time as she receives it for outpatient treatment.       Thank you,    Stella Vila MD  Attending Physician  Department of Hospital Medicine  12/3/2024

## 2024-12-03 NOTE — DISCHARGE INSTRUCTIONS
You were treated for anemia and abdominal pain as well as skin infection  You have completed abx treatment and do not need further treatment   I will place referrals for PCP, Gastroenterologist, and OBGYN for follow up   Please continue Miralax daily as a bowel regimen for your constipation

## 2024-12-03 NOTE — DISCHARGE SUMMARY
[facility] [address]  [facility]  Discharge Summary      Patient Name: Kavya Bundy  MRN: [id number]  [facility]: [id number]  Patient Class: IP- Inpatient  Admission Date: 11/26/2024  Hospital Length of Stay: 6 days  Discharge Date and Time:  12/03/2024 11:05 AM  Attending Physician: Stella Vila MD   Discharging Provider: Stella Vila MD  Primary Care Provider: Otilia Primary Doctor  [facility]: [facility] Stella Vila MD  Primary Care Team: [facility]    HPI:   Ms. Sweet has a history of IVDU in remission and long term use of methadone at a clinic, recurrent leg abscesses, last seen in June 2024 for an abscess, incision and drainage performed, cultures grew strep intermedius, prescribed bactrim, and now presents for abdominal pain and tenderness along with vomiting. The patient reports abdominal pain started yesterday evening approx 5 hours prior to arrival. She had sudden sharp, stabbing pain in her right lower quadrant. Shortly after the pain began, she experienced intractable vomiting. Her spouse called EMS. She endorses headache and dizziness but no chest pain or palpitations. Two days prior, the patient reported having a fever. She remembered that one of her temperatures were greater than 100. Additionally she reports an abscess on the right lower leg that is purulent and painful. Her spouse says she has recurrent abscesses often and does not complete the antibiotic regimen prescribed. She is currently on her menses. She denied dysuria and hematuria. Her spouse says she was concerned for a UTI due to dysuria two weeks prior to tonight's presentation. Last bowel movement was 11/24.     In ED, Pt afebrile w/ soft BP 94/51, other VSS on RA. WBC 16.28. Hgb 7.9. No significant electrolyte abnormalities. UA noninfectious but w/ 26 RBC. CT a/p: Mild asymmetric enlargement of the right ovary of uncertain clinical significance in this patient with reported  right lower quadrant pain. Given IV morphine, IV antiemetics, zosyn and vancomycin and IVF.     Procedure(s) (LRB):  EGD (ESOPHAGOGASTRODUODENOSCOPY) (N/A)      Hospital Course:   Patient admitted for abdominal pain, no clear cause or source identified. CT abdomen pelvis and Pelvic US reassuring Continue with pain management plan and abx for possible skin/soft tissue infection. She was anemic, possibly from menstrual cycles as there were no signs of bleeding per rectum or hematemesis. She continued to require multiple transfusions due to anemia and little improvement in abdominal pain. Bowel regimen was increased. Gastroenterology consutled and felt pain likely multifactorial but recommended EGD due to anemia and increased NSAID use. EGD performed 12/2/24 and reassuring. Patient started having bowel movements 12/2/24 with improvement in symptoms. Discussed OBGYN and Gastroenterology follow up outpatient with patient. PCP also arranged for discharge. Given history of constipation discharged home with daily miralax to titrate to one soft stool per day.     Pt deemed appropriate for discharge; seen and examined prior to departure. Plan discussed with pt, who was agreeable and amenable; medications were discussed and reviewed, outpatient follow-up scheduled, ER precautions were given, all questions were answered to the pt's satisfaction, and she was subsequently discharged.       Goals of Care Treatment Preferences:  Code Status: Full Code      SDOH Screening:  The patient was screened for utility difficulties, food insecurity, transport difficulties, housing insecurity, and interpersonal safety and there were no concerns identified this admission.     Consults:   Consults (From admission, onward)          Status Ordering Provider     Inpatient consult to Gastroenterology  Once        Provider:  (Not yet assigned)    GEMMA Stock            No new Assessment & Plan notes have been filed under this  hospital service since the last note was generated.  Service: Hospital Medicine    Final Active Diagnoses:    Diagnosis Date Noted POA    PRINCIPAL PROBLEM:  Intractable lower abdominal pain [R10.30] 11/26/2024 Yes    Nausea and vomiting [R11.2] 11/26/2024 Yes    Acute blood loss anemia [D62] 11/27/2024 Yes    Hypotension [I95.9] 11/26/2024 Yes    Leg abscess [L02.419] 11/26/2024 Yes    Tobacco abuse [Z72.0] 11/26/2024 Yes    Polysubstance abuse [F19.10] 06/09/2017 Yes    Tobacco dependency [F17.200] 11/28/2024 Yes      Problems Resolved During this Admission:       Discharged Condition: good    Disposition:     Follow Up:   Follow-up Information       No, Primary Doctor .                           Patient Instructions:      Ambulatory referral/consult to Gastroenterology   Standing Status: Future   Referral Priority: Routine Referral Type: Consultation   Referral Reason: Specialty Services Required   Requested Specialty: Gastroenterology   Number of Visits Requested: 1     Ambulatory referral/consult to Gynecology   Standing Status: Future   Referral Priority: Routine Referral Type: Consultation   Referral Reason: Specialty Services Required   Requested Specialty: Gynecology   Number of Visits Requested: 1       Significant Diagnostic Studies: N/A    Pending Diagnostic Studies:       Procedure Component Value Units Date/Time    Basic Metabolic Panel [[id number]]     Order Status: Sent Lab Status: No result     Specimen: Blood     C. trachomatis/N. gonorrhoeae by AMP DNA [facility]; Urine [[id number]] Collected: 11/30/24 1620    Order Status: Sent Lab Status: In process Updated: 11/30/24 1715    Specimen: Genital from Urine     CBC Without Differential [[id number]]     Order Status: Sent Lab Status: No result     Specimen: Blood     Specimen to Pathology, Surgery Gastrointestinal tract [[id number]] Collected: 12/02/24 1038    Order Status: Sent Lab Status: In process Updated: 12/02/24 1516    Specimen: Tissue             Medications:  Reconciled Home Medications:      Medication List        START taking these medications      dicyclomine 10 MG capsule  Commonly known as: BENTYL  Take 1 capsule (10 mg total) by mouth 4 (four) times daily as needed (abdominal pain).     famotidine 40 MG tablet  Commonly known as: PEPCID  Take 1 tablet (40 mg total) by mouth once daily.     ferrous sulfate 325 mg (65 mg iron) Tab tablet  Commonly known as: FEOSOL  Take 1 tablet (325 mg total) by mouth once daily.     polyethylene glycol 17 gram/dose powder  Commonly known as: GLYCOLAX  Take 17 g by mouth once daily. Take once or twice a day to have one soft stool per day            CONTINUE taking these medications      methadone 10 MG tablet  Commonly known as: DOLOPHINE  Take 110 mg by mouth once daily.              Indwelling Lines/Drains at time of discharge:   Lines/Drains/Airways       None                   Time spent on the discharge of patient: 35 minutes         Stella Vila MD  Department of Hospital Medicine  [facility] - Stepdown Flex ([place]-)

## 2024-12-03 NOTE — NURSING
Uneventful night, patient reported having two watery stools. Abd pain 3/10 managed by schedule Tylenol. No further complaints voiced. VSS.

## 2024-12-04 LAB
C TRACH DNA SPEC QL NAA+PROBE: NOT DETECTED
N GONORRHOEA DNA SPEC QL NAA+PROBE: NOT DETECTED

## 2024-12-05 LAB
FINAL PATHOLOGIC DIAGNOSIS: NORMAL
GROSS: NORMAL
Lab: NORMAL

## 2024-12-08 NOTE — PROGRESS NOTES
[facility] - Stepdown Flex ([place] [address])  [facility] Medicine  Progress Note    Patient Name: Kavya Bundy  MRN: [id number]  Patient Class: IP- Inpatient   Admission Date: 11/26/2024  Length of Stay: 2 days  Attending Physician: Stella Vila MD  Primary Care Provider: Otilia, Primary Doctor        Subjective:     Principal Problem:Intractable lower abdominal pain        HPI:  Ms. Sweet has a history of IVDU in remission and long term use of methadone at a clinic, recurrent leg abscesses, last seen in June 2024 for an abscess, incision and drainage performed, cultures grew strep intermedius, prescribed bactrim, and now presents for abdominal pain and tenderness along with vomiting. The patient reports abdominal pain started yesterday evening approx 5 hours prior to arrival. She had sudden sharp, stabbing pain in her right lower quadrant. Shortly after the pain began, she experienced intractable vomiting. Her spouse called EMS. She endorses headache and dizziness but no chest pain or palpitations. Two days prior, the patient reported having a fever. She remembered that one of her temperatures were greater than 100. Additionally she reports an abscess on the right lower leg that is purulent and painful. Her spouse says she has recurrent abscesses often and does not complete the antibiotic regimen prescribed. She is currently on her menses. She denied dysuria and hematuria. Her spouse says she was concerned for a UTI due to dysuria two weeks prior to tonight's presentation. Last bowel movement was 11/24.     In ED, Pt afebrile w/ soft BP 94/51, other VSS on RA. WBC 16.28. Hgb 7.9. No significant electrolyte abnormalities. UA noninfectious but w/ 26 RBC. CT a/p: Mild asymmetric enlargement of the right ovary of uncertain clinical significance in this patient with reported right lower quadrant pain. Given IV morphine, IV antiemetics, zosyn and vancomycin and IVF.     Overview/Hospital Course:  Patient admitted for  [FreeTextEntry1] : 45 y/o with paraguard IUD in place complains of one episode irregular bleeding -  reviewing the patient's history and results discussed with patient this is not likely to be abnormal it is not unusual to have 1-2 days of dark old blood prior to menses.  I do not believe this is related to her fibroid. Reviewed recent blood work - patient was midcycle when it was done - elevated FSH could be related to perimenopause/menopausal changes however given the timing unlikely to be accurate  Patient states she is very happy with paraguard and not interested in changing IUDs  At this time will continue to monitor  abdominal pain, no clear cause or source identified. Continue with pain management plan. She was anemic, possibly from menstrual cycles as she has not been having melena or bloody emesis.     Interval History: Pt seen and examined this morning on rounds. REED. Continues to have abdominal pain. Care plan reviewed. Otherwise, doing well and with no further complaints at this time.      Objective:     Vital Signs (Most Recent):  Temp: 98.4 °F (36.9 °C) (11/29/24 0729)  Pulse: 62 (11/29/24 0729)  Resp: 18 (11/29/24 0729)  BP: 96/60 (11/29/24 0729)  SpO2: 100 % (11/29/24 0729) Vital Signs (24h Range):  Temp:  [98 °F (36.7 °C)-98.7 °F (37.1 °C)] 98.4 °F (36.9 °C)  Pulse:  [53-67] 62  Resp:  [12-22] 18  SpO2:  [97 %-100 %] 100 %  BP: ()/(51-60) 96/60     Weight: 60 kg (132 lb 4.4 oz)  Body mass index is 20.72 kg/m².    Intake/Output Summary (Last 24 hours) at 11/29/2024 0746  Last data filed at 11/28/2024 1110  Gross per 24 hour   Intake 240 ml   Output --   Net 240 ml         Physical Exam  Constitutional:       Appearance: Normal appearance.   HENT:      Head: Normocephalic and atraumatic.      Nose: Nose normal.      Mouth/Throat:      Mouth: Mucous membranes are moist.   Eyes:      Extraocular Movements: Extraocular movements intact.      Pupils: Pupils are equal, round, and reactive to light.   Cardiovascular:      Rate and Rhythm: Normal rate and regular rhythm.      Heart sounds: No murmur heard.     No gallop.   Pulmonary:      Effort: Pulmonary effort is normal.      Breath sounds: Normal breath sounds. No wheezing or rales.   Abdominal:      General: Abdomen is flat. Bowel sounds are normal. There is no distension.      Palpations: Abdomen is soft.      Tenderness: There is abdominal tenderness.   Musculoskeletal:         General: No swelling or tenderness. Normal range of motion.      Cervical back: Normal range of motion and neck supple.      Right lower leg: No edema.      Left lower leg: No edema.    Skin:     General: Skin is warm and dry.      Capillary Refill: Capillary refill takes less than 2 seconds.   Neurological:      General: No focal deficit present.      Mental Status: She is alert. Mental status is at baseline.   Psychiatric:         Mood and Affect: Mood normal.             Significant Labs: All pertinent labs within the past 24 hours have been reviewed.    Significant Imaging: I have reviewed all pertinent imaging results/findings within the past 24 hours.    Assessment/Plan:      * Intractable lower abdominal pain  Nausea and vomiting    - afebrile VSS on RA  - WBC 16.28, trending  No significant electrolyte abnormalities  - UA noninfectious but w/ 26 RBC  - CT a/p: Mild asymmetric enlargement of the right ovary of uncertain clinical significance in this patient with reported right lower quadrant pain.  - pelvic US without any significant findings   - MM pain regimen: tylenol 1 g q8h, toradol 15 q6h prn  - prn antiemetics  - continue zosyn   - no clear cause of her pain. Possibly related to constipation as she has a history of constipation. Remains on her cycle. Advanced to regular diet and will re evaluate.   -increase bowel regimen, re assess abdominal pain once patient starts having bowel movements.   -consider GI consult if failure to improve.     Nausea and vomiting  No longer present, has not happened since admission      Acute blood loss anemia  Anemia is likely due to chronic blood loss and menstrual cycles . Most recent hemoglobin and hematocrit are listed below.    Plan  - Monitor serial CBC: Daily  - Transfuse PRBC if patient becomes hemodynamically unstable, symptomatic or H/H drops below 7/21.  - Patient has received 1 units of PRBCs on 11/27 , remained stable at 7   - Patient's anemia is currently stable  - no signs of GI losses (constipated and not throwing up)  - suspect due to possible infection, IV fluids and continued menstrual cycle bleeding  - if abdominal pain does not  improve and H/H continues to drop, considering GI consultation    Hypotension  - BP 90/50s  - I/s/o poor oral intake  - encourage oral fluid intake  - given several liters of fluid, plan for 1 unit PRBC 11/27.  Will continue to monitor     Leg abscess  - WBC 16, improving with treatment   - continue Zosyn   - wound care consulted    Tobacco abuse  Dangers of cigarette smoking were reviewed with patient in detail for 10 minutes and patient was encouraged to quit. Nicotine replacement options were discussed.    Polysubstance abuse  - denies any recent drug use.   - methadone 110 mg qd, follows with methadone clinic   - consider addiction psych consult if not improving      VTE Risk Mitigation (From admission, onward)           Ordered     IP VTE LOW RISK PATIENT  Once         11/26/24 0931     Place CHERYL hose  Until discontinued         11/26/24 0931     Place sequential compression device  Until discontinued         11/26/24 0931     Place sequential compression device  Until discontinued         11/26/24 0931                    Discharge Planning   ABHINAV: 12/2/2024     Code Status: Full Code   Is the patient medically ready for discharge?:     Reason for patient still in hospital (select all that apply): Patient trending condition and Treatment  Discharge Plan A: Home with family                  Stella Vila MD  Department of Hospital Medicine   [facility] - Stepdown Flex ([place])     16-20 (moderately severe stroke)